# Patient Record
Sex: FEMALE | Race: BLACK OR AFRICAN AMERICAN | NOT HISPANIC OR LATINO | Employment: FULL TIME | URBAN - METROPOLITAN AREA
[De-identification: names, ages, dates, MRNs, and addresses within clinical notes are randomized per-mention and may not be internally consistent; named-entity substitution may affect disease eponyms.]

---

## 2023-04-17 ENCOUNTER — APPOINTMENT (OUTPATIENT)
Dept: PRIMARY CARE | Facility: CLINIC | Age: 63
End: 2023-04-17
Payer: COMMERCIAL

## 2023-07-31 ENCOUNTER — APPOINTMENT (OUTPATIENT)
Dept: PRIMARY CARE | Facility: CLINIC | Age: 63
End: 2023-07-31
Payer: COMMERCIAL

## 2023-09-08 PROBLEM — E55.9 VITAMIN D DEFICIENCY: Status: ACTIVE | Noted: 2023-09-08

## 2023-09-08 PROBLEM — K59.09 CHRONIC CONSTIPATION: Status: ACTIVE | Noted: 2023-09-08

## 2023-09-08 PROBLEM — D23.72 OTHER BENIGN NEOPLASM OF SKIN OF LEFT LOWER LIMB, INCLUDING HIP: Status: ACTIVE | Noted: 2023-05-15

## 2023-09-08 PROBLEM — M50.30 DEGENERATIVE DISC DISEASE, CERVICAL: Status: ACTIVE | Noted: 2023-09-08

## 2023-09-08 PROBLEM — D72.819 LEUKOPENIA: Status: ACTIVE | Noted: 2023-09-08

## 2023-09-08 PROBLEM — R00.1 BRADYCARDIA, DRUG INDUCED: Status: ACTIVE | Noted: 2023-09-08

## 2023-09-08 PROBLEM — I10 BENIGN ESSENTIAL HYPERTENSION: Status: ACTIVE | Noted: 2023-09-08

## 2023-09-08 PROBLEM — I95.1 ORTHOSTASIS: Status: ACTIVE | Noted: 2023-09-08

## 2023-09-08 PROBLEM — S29.012A STRAIN OF LATISSIMUS DORSI MUSCLE: Status: ACTIVE | Noted: 2023-09-08

## 2023-09-08 PROBLEM — H81.10 BENIGN PAROXYSMAL POSITIONAL VERTIGO: Status: ACTIVE | Noted: 2023-09-08

## 2023-09-08 PROBLEM — G57.01 PIRIFORMIS SYNDROME OF RIGHT SIDE: Status: ACTIVE | Noted: 2023-09-08

## 2023-09-08 PROBLEM — B35.1 MYCOTIC TOENAILS: Status: ACTIVE | Noted: 2023-09-08

## 2023-09-08 PROBLEM — R07.81 RIB PAIN ON RIGHT SIDE: Status: ACTIVE | Noted: 2023-09-08

## 2023-09-08 PROBLEM — R61 DIAPHORESIS: Status: ACTIVE | Noted: 2023-09-08

## 2023-09-08 PROBLEM — M54.50 LOW BACK PAIN: Status: ACTIVE | Noted: 2023-09-08

## 2023-09-08 PROBLEM — R25.2 MUSCLE CRAMPS: Status: ACTIVE | Noted: 2023-09-08

## 2023-09-08 PROBLEM — M79.676 TOE PAIN: Status: ACTIVE | Noted: 2023-09-08

## 2023-09-08 PROBLEM — N18.2 CHRONIC KIDNEY DISEASE, STAGE 2 (MILD): Status: ACTIVE | Noted: 2023-09-08

## 2023-09-08 PROBLEM — M21.42 FLAT FEET, BILATERAL: Status: ACTIVE | Noted: 2023-09-08

## 2023-09-08 PROBLEM — M21.40 PES PLANUS: Status: ACTIVE | Noted: 2023-09-08

## 2023-09-08 PROBLEM — R10.31 RIGHT GROIN PAIN: Status: ACTIVE | Noted: 2023-09-08

## 2023-09-08 PROBLEM — S80.02XA CONTUSION OF LEFT KNEE: Status: ACTIVE | Noted: 2023-09-08

## 2023-09-08 PROBLEM — M21.41 FLAT FEET, BILATERAL: Status: ACTIVE | Noted: 2023-09-08

## 2023-09-08 PROBLEM — R42 DIZZINESS: Status: ACTIVE | Noted: 2023-09-08

## 2023-09-08 PROBLEM — L85.3 XEROSIS CUTIS: Status: ACTIVE | Noted: 2023-09-08

## 2023-09-08 PROBLEM — M99.09 SEGMENTAL AND SOMATIC DYSFUNCTION: Status: ACTIVE | Noted: 2023-09-08

## 2023-09-08 PROBLEM — T50.905A BRADYCARDIA, DRUG INDUCED: Status: ACTIVE | Noted: 2023-09-08

## 2023-09-08 PROBLEM — M54.12 ACUTE CERVICAL RADICULOPATHY: Status: ACTIVE | Noted: 2023-09-08

## 2023-09-08 PROBLEM — M25.519 PAIN, JOINT, SHOULDER: Status: ACTIVE | Noted: 2023-09-08

## 2023-09-08 PROBLEM — R26.89 IMPAIRMENT OF BALANCE: Status: ACTIVE | Noted: 2023-09-08

## 2023-09-08 PROBLEM — J30.9 ALLERGIC RHINITIS: Status: ACTIVE | Noted: 2023-09-08

## 2023-09-08 PROBLEM — H61.23 IMPACTED CERUMEN OF BOTH EARS: Status: ACTIVE | Noted: 2023-09-08

## 2023-09-08 PROBLEM — M25.562 ACUTE PAIN OF LEFT KNEE: Status: ACTIVE | Noted: 2023-09-08

## 2023-09-08 PROBLEM — R26.0 ATAXIC GAIT: Status: ACTIVE | Noted: 2023-09-08

## 2023-09-08 PROBLEM — S46.812A STRAIN OF LEFT TRAPEZIUS MUSCLE: Status: ACTIVE | Noted: 2023-09-08

## 2023-09-08 PROBLEM — M19.90 ARTHRITIS: Status: ACTIVE | Noted: 2023-09-08

## 2023-09-08 PROBLEM — M54.2 NECK PAIN: Status: ACTIVE | Noted: 2023-09-08

## 2023-09-08 PROBLEM — K64.9 HEMORRHOIDS: Status: ACTIVE | Noted: 2023-09-08

## 2023-09-08 PROBLEM — E78.00 HYPERCHOLESTEROLEMIA: Status: ACTIVE | Noted: 2023-09-08

## 2023-09-11 ENCOUNTER — OFFICE VISIT (OUTPATIENT)
Dept: PRIMARY CARE | Facility: CLINIC | Age: 63
End: 2023-09-11
Payer: COMMERCIAL

## 2023-09-11 ENCOUNTER — LAB (OUTPATIENT)
Dept: LAB | Facility: LAB | Age: 63
End: 2023-09-11
Payer: COMMERCIAL

## 2023-09-11 VITALS
OXYGEN SATURATION: 99 % | BODY MASS INDEX: 22.96 KG/M2 | WEIGHT: 129.6 LBS | SYSTOLIC BLOOD PRESSURE: 117 MMHG | HEIGHT: 63 IN | DIASTOLIC BLOOD PRESSURE: 77 MMHG | HEART RATE: 64 BPM | RESPIRATION RATE: 16 BRPM

## 2023-09-11 DIAGNOSIS — E78.00 HYPERCHOLESTEROLEMIA: ICD-10-CM

## 2023-09-11 DIAGNOSIS — I10 BENIGN ESSENTIAL HYPERTENSION: ICD-10-CM

## 2023-09-11 DIAGNOSIS — G89.29 CHRONIC PAIN OF BOTH KNEES: ICD-10-CM

## 2023-09-11 DIAGNOSIS — M25.562 CHRONIC PAIN OF BOTH KNEES: ICD-10-CM

## 2023-09-11 DIAGNOSIS — N18.2 CHRONIC KIDNEY DISEASE, STAGE 2 (MILD): ICD-10-CM

## 2023-09-11 DIAGNOSIS — M25.561 CHRONIC PAIN OF BOTH KNEES: ICD-10-CM

## 2023-09-11 DIAGNOSIS — M48.02 STENOSIS OF CERVICAL SPINE: ICD-10-CM

## 2023-09-11 DIAGNOSIS — Z12.31 ENCOUNTER FOR SCREENING MAMMOGRAM FOR MALIGNANT NEOPLASM OF BREAST: ICD-10-CM

## 2023-09-11 DIAGNOSIS — I10 BENIGN ESSENTIAL HYPERTENSION: Primary | ICD-10-CM

## 2023-09-11 LAB
ALANINE AMINOTRANSFERASE (SGPT) (U/L) IN SER/PLAS: 21 U/L (ref 7–45)
ALBUMIN (G/DL) IN SER/PLAS: 4.7 G/DL (ref 3.4–5)
ALKALINE PHOSPHATASE (U/L) IN SER/PLAS: 94 U/L (ref 33–136)
ANION GAP IN SER/PLAS: 12 MMOL/L (ref 10–20)
ASPARTATE AMINOTRANSFERASE (SGOT) (U/L) IN SER/PLAS: 21 U/L (ref 9–39)
BASOPHILS (10*3/UL) IN BLOOD BY AUTOMATED COUNT: 0.03 X10E9/L (ref 0–0.1)
BASOPHILS/100 LEUKOCYTES IN BLOOD BY AUTOMATED COUNT: 1 % (ref 0–2)
BILIRUBIN TOTAL (MG/DL) IN SER/PLAS: 0.7 MG/DL (ref 0–1.2)
CALCIUM (MG/DL) IN SER/PLAS: 10 MG/DL (ref 8.6–10.6)
CARBON DIOXIDE, TOTAL (MMOL/L) IN SER/PLAS: 30 MMOL/L (ref 21–32)
CHLORIDE (MMOL/L) IN SER/PLAS: 107 MMOL/L (ref 98–107)
CHOLESTEROL (MG/DL) IN SER/PLAS: 213 MG/DL (ref 0–199)
CHOLESTEROL IN HDL (MG/DL) IN SER/PLAS: 84.2 MG/DL
CHOLESTEROL/HDL RATIO: 2.5
CREATININE (MG/DL) IN SER/PLAS: 0.86 MG/DL (ref 0.5–1.05)
EOSINOPHILS (10*3/UL) IN BLOOD BY AUTOMATED COUNT: 0.07 X10E9/L (ref 0–0.7)
EOSINOPHILS/100 LEUKOCYTES IN BLOOD BY AUTOMATED COUNT: 2.4 % (ref 0–6)
ERYTHROCYTE DISTRIBUTION WIDTH (RATIO) BY AUTOMATED COUNT: 12.1 % (ref 11.5–14.5)
ERYTHROCYTE MEAN CORPUSCULAR HEMOGLOBIN CONCENTRATION (G/DL) BY AUTOMATED: 31.4 G/DL (ref 32–36)
ERYTHROCYTE MEAN CORPUSCULAR VOLUME (FL) BY AUTOMATED COUNT: 96 FL (ref 80–100)
ERYTHROCYTES (10*6/UL) IN BLOOD BY AUTOMATED COUNT: 4.66 X10E12/L (ref 4–5.2)
ESTIMATED AVERAGE GLUCOSE FOR HBA1C: 105 MG/DL
GFR FEMALE: 75 ML/MIN/1.73M2
GLUCOSE (MG/DL) IN SER/PLAS: 82 MG/DL (ref 74–99)
HEMATOCRIT (%) IN BLOOD BY AUTOMATED COUNT: 44.6 % (ref 36–46)
HEMOGLOBIN (G/DL) IN BLOOD: 14 G/DL (ref 12–16)
HEMOGLOBIN A1C/HEMOGLOBIN TOTAL IN BLOOD: 5.3 %
IMMATURE GRANULOCYTES/100 LEUKOCYTES IN BLOOD BY AUTOMATED COUNT: 0 % (ref 0–0.9)
LDL: 119 MG/DL (ref 0–99)
LEUKOCYTES (10*3/UL) IN BLOOD BY AUTOMATED COUNT: 2.9 X10E9/L (ref 4.4–11.3)
LYMPHOCYTES (10*3/UL) IN BLOOD BY AUTOMATED COUNT: 1.2 X10E9/L (ref 1.2–4.8)
LYMPHOCYTES/100 LEUKOCYTES IN BLOOD BY AUTOMATED COUNT: 41 % (ref 13–44)
MONOCYTES (10*3/UL) IN BLOOD BY AUTOMATED COUNT: 0.27 X10E9/L (ref 0.1–1)
MONOCYTES/100 LEUKOCYTES IN BLOOD BY AUTOMATED COUNT: 9.2 % (ref 2–10)
NEUTROPHILS (10*3/UL) IN BLOOD BY AUTOMATED COUNT: 1.36 X10E9/L (ref 1.2–7.7)
NEUTROPHILS/100 LEUKOCYTES IN BLOOD BY AUTOMATED COUNT: 46.4 % (ref 40–80)
NRBC (PER 100 WBCS) BY AUTOMATED COUNT: 0 /100 WBC (ref 0–0)
PLATELETS (10*3/UL) IN BLOOD AUTOMATED COUNT: 230 X10E9/L (ref 150–450)
POTASSIUM (MMOL/L) IN SER/PLAS: 4.2 MMOL/L (ref 3.5–5.3)
PROTEIN TOTAL: 7.4 G/DL (ref 6.4–8.2)
SODIUM (MMOL/L) IN SER/PLAS: 145 MMOL/L (ref 136–145)
THYROTROPIN (MIU/L) IN SER/PLAS BY DETECTION LIMIT <= 0.05 MIU/L: 2.08 MIU/L (ref 0.44–3.98)
TRIGLYCERIDE (MG/DL) IN SER/PLAS: 49 MG/DL (ref 0–149)
URATE (MG/DL) IN SER/PLAS: 4.3 MG/DL (ref 2.3–6.7)
UREA NITROGEN (MG/DL) IN SER/PLAS: 17 MG/DL (ref 6–23)
VLDL: 10 MG/DL (ref 0–40)

## 2023-09-11 PROCEDURE — 36415 COLL VENOUS BLD VENIPUNCTURE: CPT

## 2023-09-11 PROCEDURE — 80053 COMPREHEN METABOLIC PANEL: CPT

## 2023-09-11 PROCEDURE — 85025 COMPLETE CBC W/AUTO DIFF WBC: CPT

## 2023-09-11 PROCEDURE — 90471 IMMUNIZATION ADMIN: CPT | Performed by: INTERNAL MEDICINE

## 2023-09-11 PROCEDURE — 1036F TOBACCO NON-USER: CPT | Performed by: INTERNAL MEDICINE

## 2023-09-11 PROCEDURE — 80061 LIPID PANEL: CPT

## 2023-09-11 PROCEDURE — 99214 OFFICE O/P EST MOD 30 MIN: CPT | Performed by: INTERNAL MEDICINE

## 2023-09-11 PROCEDURE — 84443 ASSAY THYROID STIM HORMONE: CPT

## 2023-09-11 PROCEDURE — 83036 HEMOGLOBIN GLYCOSYLATED A1C: CPT

## 2023-09-11 PROCEDURE — 3074F SYST BP LT 130 MM HG: CPT | Performed by: INTERNAL MEDICINE

## 2023-09-11 PROCEDURE — 90686 IIV4 VACC NO PRSV 0.5 ML IM: CPT | Performed by: INTERNAL MEDICINE

## 2023-09-11 PROCEDURE — 84550 ASSAY OF BLOOD/URIC ACID: CPT

## 2023-09-11 PROCEDURE — 3078F DIAST BP <80 MM HG: CPT | Performed by: INTERNAL MEDICINE

## 2023-09-11 RX ORDER — PSYLLIUM HUSK 3.4 G/5.8G
POWDER ORAL DAILY
COMMUNITY

## 2023-09-11 RX ORDER — AMLODIPINE BESYLATE 10 MG/1
10 TABLET ORAL DAILY
Qty: 90 TABLET | Refills: 1 | Status: SHIPPED | OUTPATIENT
Start: 2023-09-11 | End: 2024-03-11

## 2023-09-11 RX ORDER — AMLODIPINE BESYLATE 10 MG/1
10 TABLET ORAL DAILY
COMMUNITY
Start: 2021-11-18 | End: 2023-09-11 | Stop reason: SDUPTHER

## 2023-09-11 RX ORDER — LYSINE HCL 500 MG
1 TABLET ORAL DAILY
COMMUNITY
Start: 2018-08-13

## 2023-09-11 ASSESSMENT — ENCOUNTER SYMPTOMS
DEPRESSION: 0
FEVER: 0
NECK STIFFNESS: 0
DIZZINESS: 0
CHILLS: 0
COUGH: 0
FREQUENCY: 0
MYALGIAS: 0
WEAKNESS: 0
DYSURIA: 0
APPETITE CHANGE: 0
NUMBNESS: 0
ARTHRALGIAS: 0
JOINT SWELLING: 0
NECK PAIN: 0
FATIGUE: 0
OCCASIONAL FEELINGS OF UNSTEADINESS: 0
HEADACHES: 0
LIGHT-HEADEDNESS: 0
BACK PAIN: 0
ABDOMINAL DISTENTION: 0
DIAPHORESIS: 0
PALPITATIONS: 0
RHINORRHEA: 0
SHORTNESS OF BREATH: 0
BLOOD IN STOOL: 0
VOMITING: 0
HEMATURIA: 0
WHEEZING: 0
CONSTIPATION: 0
SINUS PRESSURE: 0
ABDOMINAL PAIN: 0
LOSS OF SENSATION IN FEET: 0
DIARRHEA: 0
SORE THROAT: 0
DIFFICULTY URINATING: 0
NAUSEA: 0

## 2023-09-11 ASSESSMENT — PATIENT HEALTH QUESTIONNAIRE - PHQ9
SUM OF ALL RESPONSES TO PHQ9 QUESTIONS 1 AND 2: 0
SUM OF ALL RESPONSES TO PHQ9 QUESTIONS 1 AND 2: 0
2. FEELING DOWN, DEPRESSED OR HOPELESS: NOT AT ALL
2. FEELING DOWN, DEPRESSED OR HOPELESS: NOT AT ALL
1. LITTLE INTEREST OR PLEASURE IN DOING THINGS: NOT AT ALL
1. LITTLE INTEREST OR PLEASURE IN DOING THINGS: NOT AT ALL

## 2023-09-11 NOTE — PROGRESS NOTES
"Subjective   Patient ID: Maddie Flynn is a 63 y.o. female who presents for Establish Care.    HPI   She complains of neck pain radiating into the right shoulder. As well as bilateral knee pain.     Review of Systems   Constitutional:  Negative for appetite change, chills, diaphoresis, fatigue and fever.   HENT:  Negative for congestion, ear discharge, ear pain, hearing loss, postnasal drip, rhinorrhea, sinus pressure, sore throat and tinnitus.    Eyes:  Negative for visual disturbance.   Respiratory:  Negative for cough, shortness of breath and wheezing.    Cardiovascular:  Negative for chest pain, palpitations and leg swelling.   Gastrointestinal:  Negative for abdominal distention, abdominal pain, blood in stool, constipation, diarrhea, nausea and vomiting.   Genitourinary:  Negative for decreased urine volume, difficulty urinating, dysuria, frequency, hematuria and urgency.   Musculoskeletal:  Negative for arthralgias, back pain, gait problem, joint swelling, myalgias, neck pain and neck stiffness.   Skin:  Negative for rash.   Neurological:  Negative for dizziness, weakness, light-headedness, numbness and headaches.       Objective   /77 (BP Location: Left arm, Patient Position: Sitting, BP Cuff Size: Adult)   Pulse 64   Resp 16   Ht 1.6 m (5' 3\")   Wt 58.8 kg (129 lb 9.6 oz)   SpO2 99%   BMI 22.96 kg/m²     Physical Exam  Vitals reviewed.   Constitutional:       Appearance: Normal appearance. She is normal weight.   HENT:      Right Ear: Tympanic membrane and external ear normal.      Left Ear: Tympanic membrane and external ear normal.   Eyes:      Extraocular Movements: Extraocular movements intact.      Conjunctiva/sclera: Conjunctivae normal.      Pupils: Pupils are equal, round, and reactive to light.   Cardiovascular:      Rate and Rhythm: Normal rate and regular rhythm.      Pulses: Normal pulses.   Pulmonary:      Effort: Pulmonary effort is normal.      Breath sounds: Normal breath " sounds.   Abdominal:      General: Bowel sounds are normal.      Palpations: Abdomen is soft.   Musculoskeletal:         General: Normal range of motion.      Cervical back: Normal range of motion.   Skin:     General: Skin is warm and dry.   Neurological:      General: No focal deficit present.      Mental Status: She is oriented to person, place, and time.   Psychiatric:         Mood and Affect: Mood normal.         Behavior: Behavior normal.         Assessment/Plan   Problem List Items Addressed This Visit       Benign essential hypertension - Primary     -BP below target goal 130/80  -CMP, TSH ordered  -Continue amlodipine 10 mg daily  -Educated about adverse effects of uncontrolled blood pressure, importance of self blood pressure monitoring and bring to next visit.  -Recommend low sodium diet and regular exercise            Relevant Medications    amLODIPine (Norvasc) 10 mg tablet    Other Relevant Orders    CBC and Auto Differential    Comprehensive Metabolic Panel    Hemoglobin A1C    TSH with reflex to Free T4 if abnormal    Chronic kidney disease, stage 2 (mild)     Monitor          Relevant Orders    CBC and Auto Differential    Comprehensive Metabolic Panel    Hypercholesterolemia    Relevant Orders    Hemoglobin A1C    Lipid Panel    Stenosis of cervical spine     She has done PT and continues to do her exercises at home. At the point she feels these are no longer providing adequate relief.  She is interested in IA injections, referred to orthopedics for this.          Relevant Orders    Referral to Orthopaedic Surgery    Encounter for screening mammogram for malignant neoplasm of breast    Relevant Orders    BI mammo bilateral screening tomosynthesis    Chronic pain of both knees     She walks a lot, pain is worse after walking a prolonged distance.   She does warm water soaks at home and this seems to relieve the pain  She is deferring joint imaging and PT for now. Will do such if pain worsens.  Uric  acid ordered.          Relevant Orders    Uric acid     RTC in 6 mo

## 2023-09-11 NOTE — ASSESSMENT & PLAN NOTE
-BP below target goal 130/80  -CMP, TSH ordered  -Continue amlodipine 10 mg daily  -Educated about adverse effects of uncontrolled blood pressure, importance of self blood pressure monitoring and bring to next visit.  -Recommend low sodium diet and regular exercise

## 2023-09-11 NOTE — ASSESSMENT & PLAN NOTE
She has done PT and continues to do her exercises at home. At the point she feels these are no longer providing adequate relief.  She is interested in IA injections, referred to orthopedics for this.

## 2023-09-11 NOTE — ASSESSMENT & PLAN NOTE
She walks a lot, pain is worse after walking a prolonged distance.   She does warm water soaks at home and this seems to relieve the pain  She is deferring joint imaging and PT for now. Will do such if pain worsens.  Uric acid ordered.

## 2023-09-14 NOTE — RESULT ENCOUNTER NOTE
Leukopenia with normal absolute cell count.  Will monitor.    Cholesterol levels elevated.  I recommend a statin to decrease cholesterol levels.    Uric acid levels normal.    Labs otherwise unremarkable.

## 2023-10-09 ENCOUNTER — ANCILLARY PROCEDURE (OUTPATIENT)
Dept: RADIOLOGY | Facility: CLINIC | Age: 63
End: 2023-10-09
Payer: COMMERCIAL

## 2023-10-09 VITALS — WEIGHT: 129.63 LBS | HEIGHT: 63 IN | BODY MASS INDEX: 22.97 KG/M2

## 2023-10-09 DIAGNOSIS — Z12.31 ENCOUNTER FOR SCREENING MAMMOGRAM FOR MALIGNANT NEOPLASM OF BREAST: ICD-10-CM

## 2023-10-09 PROCEDURE — 77063 BREAST TOMOSYNTHESIS BI: CPT | Mod: BILATERAL PROCEDURE | Performed by: RADIOLOGY

## 2023-10-09 PROCEDURE — 77063 BREAST TOMOSYNTHESIS BI: CPT | Mod: 50

## 2023-10-09 PROCEDURE — 77067 SCR MAMMO BI INCL CAD: CPT | Mod: BILATERAL PROCEDURE | Performed by: RADIOLOGY

## 2023-10-11 PROBLEM — M43.10 ISTHMIC SPONDYLOLISTHESIS: Status: ACTIVE | Noted: 2023-10-11

## 2023-10-11 RX ORDER — ATORVASTATIN CALCIUM 20 MG/1
20 TABLET, FILM COATED ORAL DAILY
COMMUNITY

## 2023-10-11 RX ORDER — LORATADINE 10 MG/1
10 TABLET ORAL DAILY
COMMUNITY

## 2023-10-12 ENCOUNTER — OFFICE VISIT (OUTPATIENT)
Dept: ORTHOPEDIC SURGERY | Facility: CLINIC | Age: 63
End: 2023-10-12
Payer: COMMERCIAL

## 2023-10-12 ENCOUNTER — ANCILLARY PROCEDURE (OUTPATIENT)
Dept: RADIOLOGY | Facility: CLINIC | Age: 63
End: 2023-10-12
Payer: COMMERCIAL

## 2023-10-12 VITALS — BODY MASS INDEX: 21.97 KG/M2 | WEIGHT: 124 LBS | HEIGHT: 63 IN

## 2023-10-12 DIAGNOSIS — M54.2 NECK PAIN: ICD-10-CM

## 2023-10-12 DIAGNOSIS — M54.2 NECK PAIN: Primary | ICD-10-CM

## 2023-10-12 DIAGNOSIS — M54.12 CERVICAL RADICULOPATHY: ICD-10-CM

## 2023-10-12 PROCEDURE — 1036F TOBACCO NON-USER: CPT | Performed by: PHYSICIAN ASSISTANT

## 2023-10-12 PROCEDURE — 72040 X-RAY EXAM NECK SPINE 2-3 VW: CPT | Mod: FY

## 2023-10-12 PROCEDURE — 99213 OFFICE O/P EST LOW 20 MIN: CPT | Performed by: PHYSICIAN ASSISTANT

## 2023-10-12 PROCEDURE — 72040 X-RAY EXAM NECK SPINE 2-3 VW: CPT | Performed by: RADIOLOGY

## 2023-10-12 NOTE — PROGRESS NOTES
Maddie is a 63-year-old female who presents today to be evaluated for chronic and ongoing axial neck pain right-sided with right shoulder discomfort.  Patient states the symptoms have been going on since 2009 but have progressively become more bothersome over the last year or so.    Patient states that her symptoms are all localized to the right axial spine with right trapezial and shoulder pain.  She also describes it as more of a burning sensation in the trapezial region  Thankfully no extremity radiculopathy or myelopathy bilaterally.  She has full range of motion of her arms but does describe some stiffness in the right side of her neck.  No change in her gait or balance.    From a treatment standpoint she takes turmeric, she does not like to take medication.  She has not done any formal physical therapy or pain management recently.    Family, social, and medical histories are obtained and reviewed.    I reviewed the complete 30-point review of systems that was documented on the scanned patient intake form.  All other systems are non-contributory except as defined in history of present illness.    Const: Well-appearing, well-nourished female in no distress.  Eyes: Normal appearing sclera and conjunctiva, no jaundice, pupils normal in appearance.  Neck: No masses or lymphadenopathy appreciated.  Resp: breathing comfortably, normal respiratory rate rate.  CV: No upper or lower extremity edema.  Musculoskeletal: [Normal gait]. [Able to heel/toe walk without difficulty.]  Cervical ROM is [supple].  Strength exam of the upper extremities reveals 5/5 strength in all major muscle groups the patient has normal strength in her upper extremities but it was noted that she has severe and significant tightness of her right trapezial muscle..  [No intrinsic wasting.] [Negative] Spurling sign.    Neuro: Sensation is [intact and equal bilaterally]. Deep tendon reflexes are [normal and symmetric].  [No clonus], [negative]  Camara sign, [negative] Lhermitte's sign  Skin: Intact without any lesions, normal turgor.  Psych: Alert and oriented x3, normal mood and affect.    Moving forward, we will order x-rays of her cervical spine.  We will treat this very conservatively, she was given a referral for physical therapy and she will follow-up with me in 6 weeks for clinical recheck.  If it does not improve we discussed potentially ordering an MRI of her cervical spine or even starting a work-up on her right shoulder.  Patient was in agreement.    This note was dictated using speech recognition software and was not corrected for spelling or grammatical errors

## 2023-12-07 ENCOUNTER — APPOINTMENT (OUTPATIENT)
Dept: ORTHOPEDIC SURGERY | Facility: CLINIC | Age: 63
End: 2023-12-07
Payer: COMMERCIAL

## 2024-03-06 DIAGNOSIS — I10 BENIGN ESSENTIAL HYPERTENSION: ICD-10-CM

## 2024-03-11 RX ORDER — AMLODIPINE BESYLATE 10 MG/1
10 TABLET ORAL DAILY
Qty: 30 TABLET | Refills: 0 | Status: SHIPPED | OUTPATIENT
Start: 2024-03-11 | End: 2024-03-18 | Stop reason: SDUPTHER

## 2024-03-12 ENCOUNTER — APPOINTMENT (OUTPATIENT)
Dept: PRIMARY CARE | Facility: CLINIC | Age: 64
End: 2024-03-12
Payer: COMMERCIAL

## 2024-03-18 ENCOUNTER — LAB (OUTPATIENT)
Dept: LAB | Facility: LAB | Age: 64
End: 2024-03-18
Payer: COMMERCIAL

## 2024-03-18 ENCOUNTER — OFFICE VISIT (OUTPATIENT)
Dept: PRIMARY CARE | Facility: CLINIC | Age: 64
End: 2024-03-18
Payer: COMMERCIAL

## 2024-03-18 VITALS
BODY MASS INDEX: 22.15 KG/M2 | HEIGHT: 63 IN | OXYGEN SATURATION: 98 % | DIASTOLIC BLOOD PRESSURE: 70 MMHG | RESPIRATION RATE: 16 BRPM | HEART RATE: 65 BPM | SYSTOLIC BLOOD PRESSURE: 108 MMHG | WEIGHT: 125 LBS

## 2024-03-18 DIAGNOSIS — E78.00 HYPERCHOLESTEROLEMIA: Primary | ICD-10-CM

## 2024-03-18 DIAGNOSIS — Z00.00 HEALTHCARE MAINTENANCE: ICD-10-CM

## 2024-03-18 DIAGNOSIS — E78.00 HYPERCHOLESTEROLEMIA: ICD-10-CM

## 2024-03-18 DIAGNOSIS — I10 BENIGN ESSENTIAL HYPERTENSION: ICD-10-CM

## 2024-03-18 LAB
ALBUMIN SERPL BCP-MCNC: 4.8 G/DL (ref 3.4–5)
ALP SERPL-CCNC: 103 U/L (ref 33–136)
ALT SERPL W P-5'-P-CCNC: 16 U/L (ref 7–45)
ANION GAP SERPL CALC-SCNC: 11 MMOL/L (ref 10–20)
AST SERPL W P-5'-P-CCNC: 20 U/L (ref 9–39)
BASOPHILS # BLD AUTO: 0.06 X10*3/UL (ref 0–0.1)
BASOPHILS NFR BLD AUTO: 1.6 %
BILIRUB SERPL-MCNC: 0.6 MG/DL (ref 0–1.2)
BUN SERPL-MCNC: 15 MG/DL (ref 6–23)
CALCIUM SERPL-MCNC: 10.2 MG/DL (ref 8.6–10.6)
CHLORIDE SERPL-SCNC: 104 MMOL/L (ref 98–107)
CHOLEST SERPL-MCNC: 234 MG/DL (ref 0–199)
CHOLESTEROL/HDL RATIO: 2.6
CO2 SERPL-SCNC: 33 MMOL/L (ref 21–32)
CREAT SERPL-MCNC: 0.85 MG/DL (ref 0.5–1.05)
EGFRCR SERPLBLD CKD-EPI 2021: 77 ML/MIN/1.73M*2
EOSINOPHIL # BLD AUTO: 0.07 X10*3/UL (ref 0–0.7)
EOSINOPHIL NFR BLD AUTO: 1.9 %
ERYTHROCYTE [DISTWIDTH] IN BLOOD BY AUTOMATED COUNT: 12.1 % (ref 11.5–14.5)
EST. AVERAGE GLUCOSE BLD GHB EST-MCNC: 103 MG/DL
GLUCOSE SERPL-MCNC: 64 MG/DL (ref 74–99)
HBA1C MFR BLD: 5.2 %
HCT VFR BLD AUTO: 46.1 % (ref 36–46)
HDLC SERPL-MCNC: 89.2 MG/DL
HGB BLD-MCNC: 14.4 G/DL (ref 12–16)
IMM GRANULOCYTES # BLD AUTO: 0.01 X10*3/UL (ref 0–0.7)
IMM GRANULOCYTES NFR BLD AUTO: 0.3 % (ref 0–0.9)
LDLC SERPL DIRECT ASSAY-MCNC: 124 MG/DL (ref 0–129)
LYMPHOCYTES # BLD AUTO: 1.3 X10*3/UL (ref 1.2–4.8)
LYMPHOCYTES NFR BLD AUTO: 35.7 %
MCH RBC QN AUTO: 29.2 PG (ref 26–34)
MCHC RBC AUTO-ENTMCNC: 31.2 G/DL (ref 32–36)
MCV RBC AUTO: 94 FL (ref 80–100)
MONOCYTES # BLD AUTO: 0.36 X10*3/UL (ref 0.1–1)
MONOCYTES NFR BLD AUTO: 9.9 %
NEUTROPHILS # BLD AUTO: 1.84 X10*3/UL (ref 1.2–7.7)
NEUTROPHILS NFR BLD AUTO: 50.6 %
NON-HDL CHOLESTEROL: 145 MG/DL (ref 0–149)
NRBC BLD-RTO: 0 /100 WBCS (ref 0–0)
PLATELET # BLD AUTO: 219 X10*3/UL (ref 150–450)
POTASSIUM SERPL-SCNC: 4 MMOL/L (ref 3.5–5.3)
PROT SERPL-MCNC: 7.5 G/DL (ref 6.4–8.2)
RBC # BLD AUTO: 4.93 X10*6/UL (ref 4–5.2)
SODIUM SERPL-SCNC: 144 MMOL/L (ref 136–145)
TSH SERPL-ACNC: 2.96 MIU/L (ref 0.44–3.98)
WBC # BLD AUTO: 3.6 X10*3/UL (ref 4.4–11.3)

## 2024-03-18 PROCEDURE — 99396 PREV VISIT EST AGE 40-64: CPT | Performed by: INTERNAL MEDICINE

## 2024-03-18 PROCEDURE — 85025 COMPLETE CBC W/AUTO DIFF WBC: CPT

## 2024-03-18 PROCEDURE — 90750 HZV VACC RECOMBINANT IM: CPT | Performed by: INTERNAL MEDICINE

## 2024-03-18 PROCEDURE — 3078F DIAST BP <80 MM HG: CPT | Performed by: INTERNAL MEDICINE

## 2024-03-18 PROCEDURE — 1036F TOBACCO NON-USER: CPT | Performed by: INTERNAL MEDICINE

## 2024-03-18 PROCEDURE — 82465 ASSAY BLD/SERUM CHOLESTEROL: CPT

## 2024-03-18 PROCEDURE — 84443 ASSAY THYROID STIM HORMONE: CPT

## 2024-03-18 PROCEDURE — 83036 HEMOGLOBIN GLYCOSYLATED A1C: CPT

## 2024-03-18 PROCEDURE — 83718 ASSAY OF LIPOPROTEIN: CPT

## 2024-03-18 PROCEDURE — 36415 COLL VENOUS BLD VENIPUNCTURE: CPT

## 2024-03-18 PROCEDURE — 3074F SYST BP LT 130 MM HG: CPT | Performed by: INTERNAL MEDICINE

## 2024-03-18 PROCEDURE — 83721 ASSAY OF BLOOD LIPOPROTEIN: CPT

## 2024-03-18 PROCEDURE — 90471 IMMUNIZATION ADMIN: CPT | Performed by: INTERNAL MEDICINE

## 2024-03-18 PROCEDURE — 80053 COMPREHEN METABOLIC PANEL: CPT

## 2024-03-18 RX ORDER — AMLODIPINE BESYLATE 10 MG/1
10 TABLET ORAL DAILY
Qty: 90 TABLET | Refills: 0 | Status: SHIPPED | OUTPATIENT
Start: 2024-03-18

## 2024-03-18 ASSESSMENT — ENCOUNTER SYMPTOMS
CONSTIPATION: 0
DIFFICULTY URINATING: 0
VOMITING: 0
NECK STIFFNESS: 0
DEPRESSION: 0
NUMBNESS: 0
SHORTNESS OF BREATH: 0
DIZZINESS: 0
DIARRHEA: 0
LIGHT-HEADEDNESS: 0
DIAPHORESIS: 0
HEMATURIA: 0
SORE THROAT: 0
BACK PAIN: 0
WHEEZING: 0
MYALGIAS: 0
ABDOMINAL DISTENTION: 0
PALPITATIONS: 0
COUGH: 0
JOINT SWELLING: 0
FEVER: 0
OCCASIONAL FEELINGS OF UNSTEADINESS: 0
FREQUENCY: 0
LOSS OF SENSATION IN FEET: 0
SINUS PRESSURE: 0
APPETITE CHANGE: 0
BLOOD IN STOOL: 0
NAUSEA: 0
FATIGUE: 0
ARTHRALGIAS: 0
WEAKNESS: 0
DYSURIA: 0
NECK PAIN: 0
CHILLS: 0
ABDOMINAL PAIN: 0
HEADACHES: 0
RHINORRHEA: 0

## 2024-03-18 ASSESSMENT — PATIENT HEALTH QUESTIONNAIRE - PHQ9
SUM OF ALL RESPONSES TO PHQ9 QUESTIONS 1 AND 2: 0
1. LITTLE INTEREST OR PLEASURE IN DOING THINGS: NOT AT ALL
2. FEELING DOWN, DEPRESSED OR HOPELESS: NOT AT ALL

## 2024-03-18 NOTE — PROGRESS NOTES
"Subjective   Patient ID: Maddie Flynn is a 63 y.o. female who presents for Annual Exam.    HPI   She present for follow-up. She is doing well generally.     Review of Systems   Constitutional:  Negative for appetite change, chills, diaphoresis, fatigue and fever.   HENT:  Negative for congestion, ear discharge, ear pain, hearing loss, postnasal drip, rhinorrhea, sinus pressure, sore throat and tinnitus.    Eyes:  Negative for visual disturbance.   Respiratory:  Negative for cough, shortness of breath and wheezing.    Cardiovascular:  Negative for chest pain, palpitations and leg swelling.   Gastrointestinal:  Negative for abdominal distention, abdominal pain, blood in stool, constipation, diarrhea, nausea and vomiting.   Genitourinary:  Negative for decreased urine volume, difficulty urinating, dysuria, frequency, hematuria and urgency.   Musculoskeletal:  Negative for arthralgias, back pain, gait problem, joint swelling, myalgias, neck pain and neck stiffness.   Skin:  Negative for rash.   Neurological:  Negative for dizziness, weakness, light-headedness, numbness and headaches.         Objective   /70   Pulse 65   Resp 16   Ht 1.6 m (5' 3\")   Wt 56.7 kg (125 lb)   SpO2 98%   BMI 22.14 kg/m²     Physical Exam  Vitals reviewed.   Constitutional:       Appearance: Normal appearance. She is normal weight.   HENT:      Right Ear: Tympanic membrane and external ear normal.      Left Ear: Tympanic membrane and external ear normal.   Eyes:      Extraocular Movements: Extraocular movements intact.      Conjunctiva/sclera: Conjunctivae normal.      Pupils: Pupils are equal, round, and reactive to light.   Cardiovascular:      Rate and Rhythm: Normal rate and regular rhythm.      Pulses: Normal pulses.   Pulmonary:      Effort: Pulmonary effort is normal.      Breath sounds: Normal breath sounds.   Abdominal:      General: Bowel sounds are normal.      Palpations: Abdomen is soft.   Musculoskeletal:         " General: Normal range of motion.      Cervical back: Normal range of motion.   Skin:     General: Skin is warm and dry.   Neurological:      General: No focal deficit present.      Mental Status: She is oriented to person, place, and time.   Psychiatric:         Mood and Affect: Mood normal.         Behavior: Behavior normal.           Assessment/Plan   Problem List Items Addressed This Visit             ICD-10-CM    Benign essential hypertension I10     -BP below target goal 130/80  -CMP, TSH ordered  -Continue amlodipine 10 mg daily  -Recommend low sodium diet and regular exercise            Relevant Medications    amLODIPine (Norvasc) 10 mg tablet    Other Relevant Orders    CBC and Auto Differential    Comprehensive Metabolic Panel    TSH with reflex to Free T4 if abnormal    Hypercholesterolemia - Primary E78.00     Report memory issues with statin hence discontinue   Consider adding ezetimibe              Relevant Orders    Cholesterol, LDL Direct    Lipid Panel Non-Fasting    Healthcare maintenance Z00.00     Mammogram UTD  Colonoscopy UTD   Shingrx first does given in office today          Relevant Orders    CBC and Auto Differential    Cholesterol, LDL Direct    Comprehensive Metabolic Panel    Hemoglobin A1C    Lipid Panel Non-Fasting    TSH with reflex to Free T4 if abnormal   RTC in 6 mo

## 2024-03-18 NOTE — ASSESSMENT & PLAN NOTE
-BP below target goal 130/80  -CMP, TSH ordered  -Continue amlodipine 10 mg daily  -Recommend low sodium diet and regular exercise

## 2024-03-19 NOTE — RESULT ENCOUNTER NOTE
Your Cholesterol levels are elevated.  Patient unable to tolerate statin.  I recommend eating a healthy diet, avoiding foods high in saturated fats and processed sugars, as well as exercising regularly.     Blood test otherwise normal.

## 2024-05-13 ENCOUNTER — CLINICAL SUPPORT (OUTPATIENT)
Dept: PRIMARY CARE | Facility: CLINIC | Age: 64
End: 2024-05-13
Payer: MEDICARE

## 2024-05-13 DIAGNOSIS — Z23 ENCOUNTER FOR ADMINISTRATION OF VACCINE: Primary | ICD-10-CM

## 2024-05-13 PROCEDURE — 90750 HZV VACC RECOMBINANT IM: CPT | Performed by: INTERNAL MEDICINE

## 2024-05-13 PROCEDURE — 90471 IMMUNIZATION ADMIN: CPT | Performed by: INTERNAL MEDICINE

## 2024-09-05 DIAGNOSIS — I10 BENIGN ESSENTIAL HYPERTENSION: ICD-10-CM

## 2024-09-06 RX ORDER — AMLODIPINE BESYLATE 10 MG/1
10 TABLET ORAL DAILY
Qty: 90 TABLET | Refills: 0 | Status: SHIPPED | OUTPATIENT
Start: 2024-09-06

## 2024-09-23 ENCOUNTER — APPOINTMENT (OUTPATIENT)
Dept: PRIMARY CARE | Facility: CLINIC | Age: 64
End: 2024-09-23
Payer: COMMERCIAL

## 2024-12-09 ENCOUNTER — APPOINTMENT (OUTPATIENT)
Dept: PRIMARY CARE | Facility: CLINIC | Age: 64
End: 2024-12-09
Payer: MEDICARE

## 2024-12-12 ENCOUNTER — OFFICE VISIT (OUTPATIENT)
Dept: PRIMARY CARE | Facility: CLINIC | Age: 64
End: 2024-12-12
Payer: MEDICARE

## 2024-12-12 VITALS
DIASTOLIC BLOOD PRESSURE: 76 MMHG | RESPIRATION RATE: 18 BRPM | BODY MASS INDEX: 22.32 KG/M2 | TEMPERATURE: 98 F | WEIGHT: 126 LBS | HEIGHT: 63 IN | SYSTOLIC BLOOD PRESSURE: 120 MMHG | OXYGEN SATURATION: 99 % | HEART RATE: 62 BPM

## 2024-12-12 DIAGNOSIS — R42 DIZZINESS: ICD-10-CM

## 2024-12-12 DIAGNOSIS — M54.31 SCIATICA OF RIGHT SIDE: ICD-10-CM

## 2024-12-12 DIAGNOSIS — M25.561 CHRONIC PAIN OF RIGHT KNEE: Primary | ICD-10-CM

## 2024-12-12 DIAGNOSIS — G89.29 CHRONIC PAIN OF RIGHT KNEE: Primary | ICD-10-CM

## 2024-12-12 PROBLEM — M54.50 LOW BACK PAIN: Status: RESOLVED | Noted: 2023-09-08 | Resolved: 2024-12-12

## 2024-12-12 PROBLEM — R25.2 MUSCLE CRAMPS: Status: RESOLVED | Noted: 2023-09-08 | Resolved: 2024-12-12

## 2024-12-12 PROBLEM — M54.2 NECK PAIN: Status: RESOLVED | Noted: 2023-09-08 | Resolved: 2024-12-12

## 2024-12-12 PROBLEM — S29.012A STRAIN OF LATISSIMUS DORSI MUSCLE: Status: RESOLVED | Noted: 2023-09-08 | Resolved: 2024-12-12

## 2024-12-12 PROBLEM — S46.812A STRAIN OF LEFT TRAPEZIUS MUSCLE: Status: RESOLVED | Noted: 2023-09-08 | Resolved: 2024-12-12

## 2024-12-12 PROBLEM — S80.02XA CONTUSION OF LEFT KNEE: Status: RESOLVED | Noted: 2023-09-08 | Resolved: 2024-12-12

## 2024-12-12 PROBLEM — R10.31 RIGHT GROIN PAIN: Status: RESOLVED | Noted: 2023-09-08 | Resolved: 2024-12-12

## 2024-12-12 PROBLEM — Z00.00 HEALTHCARE MAINTENANCE: Status: RESOLVED | Noted: 2024-03-18 | Resolved: 2024-12-12

## 2024-12-12 PROBLEM — M48.02 STENOSIS OF CERVICAL SPINE: Status: RESOLVED | Noted: 2023-09-11 | Resolved: 2024-12-12

## 2024-12-12 PROBLEM — I95.1 ORTHOSTASIS: Status: RESOLVED | Noted: 2023-09-08 | Resolved: 2024-12-12

## 2024-12-12 PROBLEM — M25.562 ACUTE PAIN OF LEFT KNEE: Status: RESOLVED | Noted: 2023-09-08 | Resolved: 2024-12-12

## 2024-12-12 PROBLEM — R26.0 ATAXIC GAIT: Status: RESOLVED | Noted: 2023-09-08 | Resolved: 2024-12-12

## 2024-12-12 PROCEDURE — 1159F MED LIST DOCD IN RCRD: CPT | Performed by: NURSE PRACTITIONER

## 2024-12-12 PROCEDURE — 99214 OFFICE O/P EST MOD 30 MIN: CPT | Performed by: NURSE PRACTITIONER

## 2024-12-12 PROCEDURE — 3008F BODY MASS INDEX DOCD: CPT | Performed by: NURSE PRACTITIONER

## 2024-12-12 PROCEDURE — 3074F SYST BP LT 130 MM HG: CPT | Performed by: NURSE PRACTITIONER

## 2024-12-12 PROCEDURE — 1125F AMNT PAIN NOTED PAIN PRSNT: CPT | Performed by: NURSE PRACTITIONER

## 2024-12-12 PROCEDURE — 1036F TOBACCO NON-USER: CPT | Performed by: NURSE PRACTITIONER

## 2024-12-12 PROCEDURE — 3078F DIAST BP <80 MM HG: CPT | Performed by: NURSE PRACTITIONER

## 2024-12-12 ASSESSMENT — PAIN SCALES - GENERAL: PAINLEVEL_OUTOF10: 5

## 2024-12-12 ASSESSMENT — ENCOUNTER SYMPTOMS
CARDIOVASCULAR NEGATIVE: 1
MUSCULOSKELETAL NEGATIVE: 1
GASTROINTESTINAL NEGATIVE: 1
RESPIRATORY NEGATIVE: 1
CONSTITUTIONAL NEGATIVE: 1

## 2024-12-12 ASSESSMENT — PATIENT HEALTH QUESTIONNAIRE - PHQ9
1. LITTLE INTEREST OR PLEASURE IN DOING THINGS: NOT AT ALL
2. FEELING DOWN, DEPRESSED OR HOPELESS: NOT AT ALL
SUM OF ALL RESPONSES TO PHQ9 QUESTIONS 1 AND 2: 0

## 2024-12-12 NOTE — PROGRESS NOTES
"Chief Complaint  Maddie Flynn is a 64 y.o. female presenting for \"Knee Pain (Dr Gordon transfer- pt is here for sciatic pain in right hip and it is affecting her knee, pt states she has to wear a brace. Thinks it is starting to affect left side as well).\"    HPI     Maddie Flynn is a 64 y.o. female presenting for right knee pain, needs a brace on it at all times.  She has issues with constipation, takes tumeric.  She has seen a spinal doctor for sciatica and she has tried accupuncture, she would like to try accupuncture.      She has issues with balance and has seen three different neurologists and allergist, and a pulmonologist.          Past Medical History  Patient Active Problem List    Diagnosis Date Noted    Healthcare maintenance 03/18/2024    Isthmic spondylolisthesis 10/11/2023    Stenosis of cervical spine 09/11/2023    Encounter for screening mammogram for malignant neoplasm of breast 09/11/2023    Chronic pain of both knees 09/11/2023    Allergic rhinitis 09/08/2023    Arthritis 09/08/2023    Ataxic gait 09/08/2023    Benign essential hypertension 09/08/2023    Benign paroxysmal positional vertigo 09/08/2023    Bradycardia, drug induced 09/08/2023    Chronic kidney disease, stage 2 (mild) 09/08/2023    Chronic constipation 09/08/2023    Contusion of left knee 09/08/2023    Degenerative disc disease, cervical 09/08/2023    Diaphoresis 09/08/2023    Dizziness 09/08/2023    Flat feet, bilateral 09/08/2023    Hemorrhoids 09/08/2023    Hypercholesterolemia 09/08/2023    Impacted cerumen of both ears 09/08/2023    Impairment of balance 09/08/2023    Acute pain of left knee 09/08/2023    Leukopenia 09/08/2023    Muscle cramps 09/08/2023    Mycotic toenails 09/08/2023    Neck pain 09/08/2023    Orthostasis 09/08/2023    Pain, joint, shoulder 09/08/2023    Pes planus 09/08/2023    Piriformis syndrome of right side 09/08/2023    Rib pain on right side 09/08/2023    Right groin pain 09/08/2023    Segmental and " somatic dysfunction 09/08/2023    Cervical radiculopathy 09/08/2023    Strain of latissimus dorsi muscle 09/08/2023    Strain of left trapezius muscle 09/08/2023    Low back pain 09/08/2023    Toe pain 09/08/2023    Vitamin D deficiency 09/08/2023    Xerosis cutis 09/08/2023    Other benign neoplasm of skin of left lower limb, including hip 05/15/2023    Vestibulopathy 04/15/2011    Abnormality of gait 04/15/2011    Cervicalgia 04/15/2011        Medications  Current Outpatient Medications   Medication Instructions    amLODIPine (NORVASC) 10 mg, oral, Daily    loratadine (CLARITIN) 10 mg, Daily    multivitamin-min-FA-ginkgo (One Daily Women 50 Plus) 400-120 mcg-mg tablet 1 tablet, Daily    psyllium (Metamucil Fiber Singles) 3.4 gram packet Daily    turmeric, bulk, 95 % powder Take by mouth.        Surgical History  She has a past surgical history that includes Incision and drainage of wound (05/15/2015).     Social History  She reports that she has never smoked. She has never been exposed to tobacco smoke. She has never used smokeless tobacco. She reports that she does not currently use alcohol. She reports that she does not use drugs.    Family History  Family History   Problem Relation Name Age of Onset    Other (Other) Mother          sepsis    Heart disease Mother      Hypertension Mother      Alzheimer's disease Mother      Coronary artery disease Mother      Kidney disease Father      Heart disease Father      Cardiomyopathy Father      Heart failure Father      Dementia Father      Seizures Father      Dementia Sister      Thyroid disease Sister      Kidney disease Brother      Dementia Brother      Other (end stage renal disease) Brother          Allergies  Animal dander and House dust mite    ROS  Review of Systems   Constitutional: Negative.    Respiratory: Negative.     Cardiovascular: Negative.    Gastrointestinal: Negative.    Genitourinary: Negative.    Musculoskeletal: Negative.         Last Recorded  Vitals  /76 (BP Location: Right arm, Patient Position: Sitting, BP Cuff Size: Adult)   Pulse 62   Temp 36.7 °C (98 °F)   Resp 18   Wt 57.2 kg (126 lb)   SpO2 99%     Physical Exam  Vitals and nursing note reviewed.   Constitutional:       Appearance: Normal appearance.   Eyes:      Pupils: Pupils are equal, round, and reactive to light.   Cardiovascular:      Rate and Rhythm: Normal rate and regular rhythm.      Pulses: Normal pulses.      Heart sounds: Normal heart sounds.   Pulmonary:      Effort: Pulmonary effort is normal.      Breath sounds: Normal breath sounds.   Neurological:      Mental Status: She is alert.         Relevant Results      Assessment/Plan   There are no diagnoses linked to this encounter.        COUNSELING      Medication education:              Education:  The patient is counseled regarding potential side-effects of any and all new medications             Understanding:  Patient expressed understanding             Adherence:  No barriers to adherence identified        Alysha King, APRN-CNP

## 2024-12-29 ASSESSMENT — PROMIS GLOBAL HEALTH SCALE
RATE_QUALITY_OF_LIFE: EXCELLENT
RATE_SOCIAL_SATISFACTION: EXCELLENT
CARRYOUT_PHYSICAL_ACTIVITIES: COMPLETELY
CARRYOUT_SOCIAL_ACTIVITIES: EXCELLENT
RATE_GENERAL_HEALTH: VERY GOOD
EMOTIONAL_PROBLEMS: RARELY
RATE_AVERAGE_PAIN: 5
RATE_AVERAGE_FATIGUE: MILD
RATE_PHYSICAL_HEALTH: VERY GOOD
RATE_MENTAL_HEALTH: VERY GOOD

## 2024-12-31 ENCOUNTER — LAB (OUTPATIENT)
Dept: LAB | Facility: LAB | Age: 64
End: 2024-12-31
Payer: MEDICARE

## 2024-12-31 ENCOUNTER — OFFICE VISIT (OUTPATIENT)
Dept: PRIMARY CARE | Facility: CLINIC | Age: 64
End: 2024-12-31
Payer: MEDICARE

## 2024-12-31 VITALS
TEMPERATURE: 98.9 F | HEART RATE: 53 BPM | DIASTOLIC BLOOD PRESSURE: 66 MMHG | OXYGEN SATURATION: 99 % | BODY MASS INDEX: 22.32 KG/M2 | SYSTOLIC BLOOD PRESSURE: 124 MMHG | WEIGHT: 126 LBS | HEIGHT: 63 IN | RESPIRATION RATE: 18 BRPM

## 2024-12-31 DIAGNOSIS — I10 BENIGN ESSENTIAL HYPERTENSION: ICD-10-CM

## 2024-12-31 DIAGNOSIS — E78.00 HYPERCHOLESTEROLEMIA: ICD-10-CM

## 2024-12-31 DIAGNOSIS — Z12.31 ENCOUNTER FOR SCREENING MAMMOGRAM FOR MALIGNANT NEOPLASM OF BREAST: ICD-10-CM

## 2024-12-31 DIAGNOSIS — Z00.00 WELCOME TO MEDICARE PREVENTIVE VISIT: Primary | ICD-10-CM

## 2024-12-31 LAB
ALBUMIN SERPL BCP-MCNC: 4.6 G/DL (ref 3.4–5)
ALP SERPL-CCNC: 92 U/L (ref 33–136)
ALT SERPL W P-5'-P-CCNC: 24 U/L (ref 7–45)
ANION GAP SERPL CALCULATED.3IONS-SCNC: 10 MMOL/L (ref 10–20)
AST SERPL W P-5'-P-CCNC: 28 U/L (ref 9–39)
BILIRUB SERPL-MCNC: 0.7 MG/DL (ref 0–1.2)
BUN SERPL-MCNC: 15 MG/DL (ref 6–23)
CALCIUM SERPL-MCNC: 9.5 MG/DL (ref 8.6–10.3)
CHLORIDE SERPL-SCNC: 107 MMOL/L (ref 98–107)
CHOLEST SERPL-MCNC: 250 MG/DL (ref 0–199)
CHOLEST/HDLC SERPL: 2.9 {RATIO}
CO2 SERPL-SCNC: 28 MMOL/L (ref 21–32)
CREAT SERPL-MCNC: 0.86 MG/DL (ref 0.5–1.05)
EGFRCR SERPLBLD CKD-EPI 2021: 75 ML/MIN/1.73M*2
GLUCOSE SERPL-MCNC: 90 MG/DL (ref 74–99)
HDLC SERPL-MCNC: 85.6 MG/DL
LDLC SERPL CALC-MCNC: 151 MG/DL
NON HDL CHOLESTEROL: 164 MG/DL (ref 0–149)
POTASSIUM SERPL-SCNC: 3.9 MMOL/L (ref 3.5–5.3)
PROT SERPL-MCNC: 7.2 G/DL (ref 6.4–8.2)
SODIUM SERPL-SCNC: 141 MMOL/L (ref 136–145)
TRIGL SERPL-MCNC: 67 MG/DL (ref 0–149)
VLDL: 13 MG/DL (ref 0–40)

## 2024-12-31 PROCEDURE — 3079F DIAST BP 80-89 MM HG: CPT | Performed by: NURSE PRACTITIONER

## 2024-12-31 PROCEDURE — G0439 PPPS, SUBSEQ VISIT: HCPCS | Performed by: NURSE PRACTITIONER

## 2024-12-31 PROCEDURE — 1126F AMNT PAIN NOTED NONE PRSNT: CPT | Performed by: NURSE PRACTITIONER

## 2024-12-31 PROCEDURE — 1036F TOBACCO NON-USER: CPT | Performed by: NURSE PRACTITIONER

## 2024-12-31 PROCEDURE — 1159F MED LIST DOCD IN RCRD: CPT | Performed by: NURSE PRACTITIONER

## 2024-12-31 PROCEDURE — 3078F DIAST BP <80 MM HG: CPT | Performed by: NURSE PRACTITIONER

## 2024-12-31 PROCEDURE — 1160F RVW MEDS BY RX/DR IN RCRD: CPT | Performed by: NURSE PRACTITIONER

## 2024-12-31 PROCEDURE — 93010 ELECTROCARDIOGRAM REPORT: CPT | Performed by: NURSE PRACTITIONER

## 2024-12-31 PROCEDURE — 80061 LIPID PANEL: CPT

## 2024-12-31 PROCEDURE — 99215 OFFICE O/P EST HI 40 MIN: CPT | Mod: 25 | Performed by: NURSE PRACTITIONER

## 2024-12-31 PROCEDURE — 1124F ACP DISCUSS-NO DSCNMKR DOCD: CPT | Performed by: NURSE PRACTITIONER

## 2024-12-31 PROCEDURE — 1125F AMNT PAIN NOTED PAIN PRSNT: CPT | Performed by: NURSE PRACTITIONER

## 2024-12-31 PROCEDURE — 80053 COMPREHEN METABOLIC PANEL: CPT

## 2024-12-31 PROCEDURE — 99397 PER PM REEVAL EST PAT 65+ YR: CPT | Performed by: NURSE PRACTITIONER

## 2024-12-31 PROCEDURE — 3008F BODY MASS INDEX DOCD: CPT | Performed by: NURSE PRACTITIONER

## 2024-12-31 PROCEDURE — 3074F SYST BP LT 130 MM HG: CPT | Performed by: NURSE PRACTITIONER

## 2024-12-31 PROCEDURE — 93005 ELECTROCARDIOGRAM TRACING: CPT | Performed by: NURSE PRACTITIONER

## 2024-12-31 RX ORDER — AMLODIPINE BESYLATE 10 MG/1
10 TABLET ORAL DAILY
Qty: 90 TABLET | Refills: 1 | Status: SHIPPED | OUTPATIENT
Start: 2024-12-31

## 2024-12-31 ASSESSMENT — PAIN SCALES - GENERAL: PAINLEVEL_OUTOF10: 2

## 2024-12-31 ASSESSMENT — PATIENT HEALTH QUESTIONNAIRE - PHQ9
2. FEELING DOWN, DEPRESSED OR HOPELESS: NOT AT ALL
SUM OF ALL RESPONSES TO PHQ9 QUESTIONS 1 AND 2: 0
1. LITTLE INTEREST OR PLEASURE IN DOING THINGS: NOT AT ALL

## 2024-12-31 NOTE — PROGRESS NOTES
History Of Present Illness  Maddie Flynn is a 64 y.o. female presenting for a wellness exam.    Preventative screenings:  Due for mammo and labs.     Other medical issues/concerns:   Bradycardia     Patient Care Team:  KRISTA Fine as PCP - General (Family Medicine)  Facundo Gordon MD as PCP - [employer] PCP     General health: Excellent   Exercise: Yes daily   Caffeine: Occasional    Diet: Balanced    Functional ability:   No cognitive impairment observed.    No cognitive impairment reported by patient or family.    ADL screening:  Hearing without difficulties.  Independent in bathing, dressing, walking in home    IADL screening:  Independent in managing finances, grocery shopping, taking medications, doing housework    Home Safety:   [place] Home safety risk factors: No loose rugs, poor lighting, stairs without rails, bathroom with no grab bars    Depression screening:  Patient Health Questionnaire-2 Score: 0     CARDIAC SCREENING   The 10-year ASCVD risk score (Ivan VERNON, et al., 2019) is: 9.4%    Values used to calculate the score:      Age: 64 years      Sex: Female      Is Non- : Yes      Diabetic: No      Tobacco smoker: No      Systolic Blood Pressure: 124 mmHg      Is BP treated: Yes      HDL Cholesterol: 85.6 mg/dL      Total Cholesterol: 250 mg/dL       Past Medical History  Patient Active Problem List    Diagnosis Date Noted    Isthmic spondylolisthesis 10/11/2023    Encounter for screening mammogram for malignant neoplasm of breast 09/11/2023    Chronic pain of both knees 09/11/2023    Allergic rhinitis 09/08/2023    Arthritis 09/08/2023    Benign essential hypertension 09/08/2023    Benign paroxysmal positional vertigo 09/08/2023    Bradycardia, drug induced 09/08/2023    Chronic kidney disease, stage 2 (mild) 09/08/2023    Chronic constipation 09/08/2023    Degenerative disc disease, cervical 09/08/2023    Diaphoresis 09/08/2023    Flat feet,  bilateral 09/08/2023    Hemorrhoids 09/08/2023    Hypercholesterolemia 09/08/2023    Impacted cerumen of both ears 09/08/2023    Impairment of balance 09/08/2023    Leukopenia 09/08/2023    Mycotic toenails 09/08/2023    Pain, joint, shoulder 09/08/2023    Pes planus 09/08/2023    Piriformis syndrome of right side 09/08/2023    Rib pain on right side 09/08/2023    Segmental and somatic dysfunction 09/08/2023    Cervical radiculopathy 09/08/2023    Toe pain 09/08/2023    Vitamin D deficiency 09/08/2023    Xerosis cutis 09/08/2023    Other benign neoplasm of skin of left lower limb, including hip 05/15/2023    Vestibulopathy 04/15/2011    Abnormality of gait 04/15/2011    Cervicalgia 04/15/2011        Medications  Medications and current supplements were reviewed and recorded.   Does the patient use opiate medications:  NO . If yes, do they take medication appropriately: NA.  Current Outpatient Medications   Medication Sig Dispense Refill    loratadine (Claritin) 10 mg tablet Take 1 tablet (10 mg) by mouth once daily.      multivitamin-min-FA-ginkgo (One Daily Women 50 Plus) 400-120 mcg-mg tablet Take 1 tablet by mouth once daily.      psyllium (Metamucil Fiber Singles) 3.4 gram packet Take by mouth once daily.      turmeric, bulk, 95 % powder Take by mouth.      amLODIPine (Norvasc) 10 mg tablet Take 1 tablet (10 mg) by mouth once daily. 90 tablet 1     No current facility-administered medications for this visit.        Surgical History  She has a past surgical history that includes Incision and drainage of wound (05/15/2015) and Tubal ligation (2/7/1989).     Social History  She reports that she has never smoked. She has never been exposed to tobacco smoke. She has never used smokeless tobacco. She reports that she does not currently use alcohol. She reports that she does not use drugs.    Family History  Family History   Problem Relation Name Age of Onset    Other (Other) Mother Evita         sepsis    Heart  "disease Mother Evita     Hypertension Mother Evita     Alzheimer's disease Mother Evita     Coronary artery disease Mother Evita     Kidney disease Father B F     Heart disease Father B F     Cardiomyopathy Father B F     Heart failure Father B F     Dementia Father B F     Seizures Father B F     Arthritis Father B F     Hearing loss Father B F     Stroke Father B F     Dementia Sister Janeth     Thyroid disease Sister Janeth     Diabetes Sister Janeth     Kidney disease Sister Janeth     Kidney disease Brother Hero     Dementia Brother Hero     Other (end stage renal disease) Brother Hero     Hernia Brother Manuel     Kidney disease Sister Janae     Vision loss Mother's Sister Sandra         Allergies  Animal dander, House dust mite, and Meclizine    Immunizations  Immunization History   Administered Date(s) Administered    Flu vaccine (IIV4), preservative free *Check age/dose* 09/13/2021, 10/17/2022, 09/11/2023    Influenza, Unspecified 09/14/2011, 08/20/2012, 11/04/2013, 10/20/2014    Influenza, seasonal, injectable 10/15/2018, 10/18/2019, 09/13/2021    MMR vaccine, subcutaneous (MMR II) 06/06/2003, 06/10/2003    Moderna SARS-CoV-2 Vaccination 01/10/2021, 02/08/2021, 11/16/2021    PPD Test 05/02/2006    Pneumococcal Conjugate PCV 7 1959    Tdap vaccine, age 7 year and older (BOOSTRIX, ADACEL) 08/20/2012    Zoster vaccine, recombinant, adult (SHINGRIX) 03/18/2024, 05/13/2024        ROS  Negative, except as discussed in HPI     Vitals  /66 (BP Location: Right arm, Patient Position: Sitting, BP Cuff Size: Adult)   Pulse 53   Temp 37.2 °C (98.9 °F)   Resp 18   Ht 1.6 m (5' 3\")   Wt 57.2 kg (126 lb)   LMP  (LMP Unknown)   SpO2 99%   BMI 22.32 kg/m²      Physical Exam  Vitals and nursing note reviewed.   Constitutional:       Appearance: Normal appearance.   HENT:      Head: Normocephalic and atraumatic.      Right Ear: Tympanic membrane, ear canal and external ear normal.      " Left Ear: Tympanic membrane, ear canal and external ear normal.      Nose: Nose normal.      Mouth/Throat:      Mouth: Mucous membranes are moist.      Pharynx: Oropharynx is clear.   Eyes:      Extraocular Movements: Extraocular movements intact.      Conjunctiva/sclera: Conjunctivae normal.      Pupils: Pupils are equal, round, and reactive to light.   Neck:      Thyroid: No thyromegaly.   Cardiovascular:      Rate and Rhythm: Regular rhythm. Bradycardia present.      Pulses: Normal pulses.      Heart sounds: Normal heart sounds, S1 normal and S2 normal.   Pulmonary:      Effort: Pulmonary effort is normal.      Breath sounds: Normal breath sounds. No wheezing or rhonchi.   Abdominal:      General: Bowel sounds are normal.      Palpations: Abdomen is soft. There is no mass.      Tenderness: There is no abdominal tenderness. There is no guarding.   Genitourinary:     Comments: Not examined  Musculoskeletal:         General: Normal range of motion.      Cervical back: Normal range of motion.      Right lower leg: No edema.      Left lower leg: No edema.   Lymphadenopathy:      Cervical: No cervical adenopathy.   Skin:     General: Skin is warm and dry.      Capillary Refill: Capillary refill takes less than 2 seconds.      Findings: No rash.   Neurological:      General: No focal deficit present.      Mental Status: She is alert and oriented to person, place, and time. Mental status is at baseline.      Cranial Nerves: Cranial nerves 2-12 are intact. No cranial nerve deficit.      Sensory: Sensation is intact.      Motor: Motor function is intact.      Coordination: Coordination is intact.      Gait: Gait is intact.   Psychiatric:         Mood and Affect: Mood normal.         Behavior: Behavior normal.         Thought Content: Thought content normal.         Judgment: Judgment normal.         Relevant Results  Lab Results   Component Value Date    WBC 3.6 (L) 03/18/2024    WBC 2.9 (L) 09/11/2023    HGB 14.4  03/18/2024    HGB 14.0 09/11/2023    HCT 46.1 (H) 03/18/2024    HCT 44.6 09/11/2023    MCV 94 03/18/2024    MCV 96 09/11/2023     03/18/2024     09/11/2023     Lab Results   Component Value Date     12/31/2024     03/18/2024    K 3.9 12/31/2024    K 4.0 03/18/2024     12/31/2024     03/18/2024    CO2 28 12/31/2024    CO2 33 (H) 03/18/2024    BUN 15 12/31/2024    BUN 15 03/18/2024    CREATININE 0.86 12/31/2024    CREATININE 0.85 03/18/2024    CALCIUM 9.5 12/31/2024    CALCIUM 10.2 03/18/2024    PROT 7.2 12/31/2024    PROT 7.5 03/18/2024    BILITOT 0.7 12/31/2024    BILITOT 0.6 03/18/2024    ALKPHOS 92 12/31/2024    ALKPHOS 103 03/18/2024    ALT 24 12/31/2024    ALT 16 03/18/2024    AST 28 12/31/2024    AST 20 03/18/2024    GLUCOSE 90 12/31/2024    GLUCOSE 64 (L) 03/18/2024     Lab Results   Component Value Date    HGBA1C 5.2 03/18/2024     Lab Results   Component Value Date    TSH 2.96 03/18/2024    FREET4 1.33 05/29/2020      Lab Results   Component Value Date    CHOL 250 (H) 12/31/2024    TRIG 67 12/31/2024    HDL 85.6 12/31/2024    LDLDIRECT 124 03/18/2024           Assessment/Plan   Maddie was seen today for annual exam.  Diagnoses and all orders for this visit:  Welcome to Medicare preventive visit (Primary)  -     ECG 12 lead (Clinic Performed)  Benign essential hypertension  -     Comprehensive Metabolic Panel; Future  -     amLODIPine (Norvasc) 10 mg tablet; Take 1 tablet (10 mg) by mouth once daily.  Hypercholesterolemia  -     Lipid Panel; Future  Encounter for screening mammogram for malignant neoplasm of breast  -     BI mammo bilateral screening tomosynthesis; Future       Medications Discontinued During This Encounter   Medication Reason    amLODIPine (Norvasc) 10 mg tablet Reorder        Counseling:   Medication education:   -Education:  The patient is counseled regarding potential side-effects of any and all new medications  -Understanding:  Patient expressed  understanding of information discussed today  -Adherence:  No barriers to adherence identified    Advanced care planning: Discussed including healthcare power of  as well as specific end-of-life choices and/or directives was discussed with the patient , voluntarily, and advance care decision was documented in the patient's record.     Final treatment plan is a result of shared decision making with patient.         Alysha Larsenec, APRN-CNP         Tobacco/Alcohol/Opioid use, as well as Illicit Drug Use was screened for/reviewed and documented in Social Documentation section of the chart and medication list as appropriate    Depression Screening  Depression screening completed using the PHQ-2 questions with results documented in the chart/encounter (15m).  (See Rooming Screening section for documentation, or note for additional information)    Cardiac Risk Assessment  Cardiovascular risk was discussed and, if needed, lifestyle modifications recommended, including nutritional choices, exercise, and elimination of habits contributing to risk.   We agreed on a plan to reduce the current cardiovascular risk based on above discussion as needed.     Aspirin use/disuse was discussed and documented in the Problem List of the medical record (under Cardiac Risk Counseling) after reviewing the updated guidelines below:  Consider low dose Aspirin ( mg) use if the benefit for cardiovascular disease prevention outweighs risk for bleeding complications.   In general, low dose ASA should be considered:  In patients WITHOUT prior MI/stroke/PAD (primary prevention):   a. Age <60: Use if 10-year cardiovascular disease risk >20%, with discussion of risks and benefits with patient  b. Age 60-<70: Use if 10-year cardiovascular disease risk >20% and low bleeding (e.g., gastrointestinal) risk, with discussion of risks and benefits with patient  c. Age >=70: Do not use    In patients WITH prior MI/stroke/PAD (secondary  prevention):   Generally use unless extremely high bleeding (e.g., gastrointenstinal) risk, with discussion of risks and benefits with patient    Advance Directives Discussion  Advanced Care Planning (including a Living Will, Healthcare POA, as well as specific end of life choices and/or directives), was discussed with the patient and/or surrogate, voluntarily, and details of that discussion documented in the Problem List (under Advanced Directives Discussion) of the medical record.    (~16 min spent discussing above)     During the course of the visit the patient was educated and counseled about age appropriate screening and preventive services. Completed preventive screenings were documented in the chart and orders were placed for outstanding screenings/procedures as documented in the Assessment and Plan.    Patient Instructions (the written plan) was given to the patient at check out.

## 2025-01-14 ENCOUNTER — HOSPITAL ENCOUNTER (OUTPATIENT)
Dept: RADIOLOGY | Facility: CLINIC | Age: 65
Discharge: HOME | End: 2025-01-14
Payer: MEDICARE

## 2025-01-14 VITALS — WEIGHT: 126 LBS | BODY MASS INDEX: 22.32 KG/M2 | HEIGHT: 63 IN

## 2025-01-14 DIAGNOSIS — Z12.31 ENCOUNTER FOR SCREENING MAMMOGRAM FOR MALIGNANT NEOPLASM OF BREAST: ICD-10-CM

## 2025-01-14 PROCEDURE — 77063 BREAST TOMOSYNTHESIS BI: CPT | Performed by: STUDENT IN AN ORGANIZED HEALTH CARE EDUCATION/TRAINING PROGRAM

## 2025-01-14 PROCEDURE — 77067 SCR MAMMO BI INCL CAD: CPT

## 2025-01-14 PROCEDURE — 77067 SCR MAMMO BI INCL CAD: CPT | Performed by: STUDENT IN AN ORGANIZED HEALTH CARE EDUCATION/TRAINING PROGRAM

## 2025-01-20 ENCOUNTER — TELEPHONE (OUTPATIENT)
Dept: PRIMARY CARE | Facility: CLINIC | Age: 65
End: 2025-01-20
Payer: MEDICARE

## 2025-01-21 NOTE — TELEPHONE ENCOUNTER
Alysha VILLA [facility], APRN-CNP  1/14/2025  9:35 AM EST        Patient's total cholesterol has gone up from 234 to 250 we want this below 200 her LDL has also gone up this is her bad cholesterol is gone from 119 to 151 she needs to follow a low-fat low-cholesterol diet exercise daily she is not quite at the point where she needs to take medication but inching upward her metabolic panel was good this checks kidney function liver function and fasting blood sugar

## 2025-06-03 ENCOUNTER — OFFICE VISIT (OUTPATIENT)
Dept: PRIMARY CARE | Facility: CLINIC | Age: 65
End: 2025-06-03
Payer: MEDICARE

## 2025-06-03 VITALS
OXYGEN SATURATION: 99 % | SYSTOLIC BLOOD PRESSURE: 120 MMHG | HEIGHT: 63 IN | BODY MASS INDEX: 22.32 KG/M2 | TEMPERATURE: 97.2 F | RESPIRATION RATE: 18 BRPM | DIASTOLIC BLOOD PRESSURE: 84 MMHG | HEART RATE: 55 BPM | WEIGHT: 126 LBS

## 2025-06-03 DIAGNOSIS — Z00.00 ANNUAL WELLNESS VISIT: Primary | ICD-10-CM

## 2025-06-03 DIAGNOSIS — I10 BENIGN ESSENTIAL HYPERTENSION: ICD-10-CM

## 2025-06-03 DIAGNOSIS — E78.00 HYPERCHOLESTEROLEMIA: ICD-10-CM

## 2025-06-03 DIAGNOSIS — J30.2 SEASONAL ALLERGIES: ICD-10-CM

## 2025-06-03 PROBLEM — R07.81 RIB PAIN ON RIGHT SIDE: Status: RESOLVED | Noted: 2023-09-08 | Resolved: 2025-06-03

## 2025-06-03 PROBLEM — B35.1 MYCOTIC TOENAILS: Status: RESOLVED | Noted: 2023-09-08 | Resolved: 2025-06-03

## 2025-06-03 PROBLEM — Z12.31 ENCOUNTER FOR SCREENING MAMMOGRAM FOR MALIGNANT NEOPLASM OF BREAST: Status: RESOLVED | Noted: 2023-09-11 | Resolved: 2025-06-03

## 2025-06-03 PROCEDURE — 99387 INIT PM E/M NEW PAT 65+ YRS: CPT | Performed by: NURSE PRACTITIONER

## 2025-06-03 PROCEDURE — 99215 OFFICE O/P EST HI 40 MIN: CPT | Performed by: NURSE PRACTITIONER

## 2025-06-03 RX ORDER — LORATADINE 10 MG/1
10 TABLET ORAL DAILY
Qty: 90 TABLET | Refills: 1 | Status: SHIPPED | OUTPATIENT
Start: 2025-06-03

## 2025-06-03 RX ORDER — AMLODIPINE BESYLATE 10 MG/1
10 TABLET ORAL DAILY
Qty: 90 TABLET | Refills: 1 | Status: SHIPPED | OUTPATIENT
Start: 2025-06-03

## 2025-06-03 ASSESSMENT — PAIN SCALES - GENERAL: PAINLEVEL_OUTOF10: 2

## 2025-06-03 NOTE — PROGRESS NOTES
History Of Present Illness  Maddie Flynn is a 65 y.o. female presenting for a wellness exam.    Patient is here for her annual wellness exam as well as lab work    Does have high cholesterol will check labs today patient's blood pressure is well-controlled on her medication    Patient Care Team:  KRISTA Fine as PCP - General (Family Medicine)  KRISTA Fine as PCP - [employer] PCP     General health: Good  Exercise: Balanced  Caffeine: Limited   Diet: Balanced    Functional ability:   No cognitive impairment observed.    No cognitive impairment reported by patient or family.    ADL screening:  Hearing without difficulties.  Independent in bathing, dressing, walking in home    IADL screening:  Independent in managing finances, grocery shopping, taking medications, doing housework    Home Safety:   [place] Home safety risk factors: No loose rugs, poor lighting, stairs without rails, bathroom with no grab bars    Depression screening:  Patient Health Questionnaire-2 Score: 0     CARDIAC SCREENING   The 10-year ASCVD risk score (Ivan VERNON, et al., 2019) is: 9.5%    Values used to calculate the score:      Age: 65 years      Sex: Female      Is Non- : Yes      Diabetic: No      Tobacco smoker: No      Systolic Blood Pressure: 120 mmHg      Is BP treated: Yes      HDL Cholesterol: 85.6 mg/dL      Total Cholesterol: 250 mg/dL       Past Medical History  Patient Active Problem List    Diagnosis Date Noted    Isthmic spondylolisthesis 10/11/2023    Chronic pain of both knees 09/11/2023    Allergic rhinitis 09/08/2023    Arthritis 09/08/2023    Benign essential hypertension 09/08/2023    Benign paroxysmal positional vertigo 09/08/2023    Bradycardia, drug induced 09/08/2023    Chronic kidney disease, stage 2 (mild) 09/08/2023    Chronic constipation 09/08/2023    Degenerative disc disease, cervical 09/08/2023    Diaphoresis 09/08/2023    Flat feet, bilateral  09/08/2023    Hemorrhoids 09/08/2023    Hypercholesterolemia 09/08/2023    Impacted cerumen of both ears 09/08/2023    Impairment of balance 09/08/2023    Leukopenia 09/08/2023    Pain, joint, shoulder 09/08/2023    Pes planus 09/08/2023    Piriformis syndrome of right side 09/08/2023    Segmental and somatic dysfunction 09/08/2023    Cervical radiculopathy 09/08/2023    Toe pain 09/08/2023    Vitamin D deficiency 09/08/2023    Xerosis cutis 09/08/2023    Other benign neoplasm of skin of left lower limb, including hip 05/15/2023    Vestibulopathy 04/15/2011    Abnormality of gait 04/15/2011    Cervicalgia 04/15/2011        Medications  Medications and current supplements were reviewed and recorded.   Does the patient use opiate medications: No. If yes, do they take medication appropriately: N/A.  Medications ordered prior to the current encounter[1]     Surgical History  She has a past surgical history that includes Incision and drainage of wound (05/15/2015) and Tubal ligation (2/7/1989).     Social History  She reports that she has never smoked. She has never been exposed to tobacco smoke. She has never used smokeless tobacco. She reports that she does not currently use alcohol. She reports that she does not use drugs.    Family History  Family History[2]     Allergies  Animal dander, House dust mite, and Meclizine    Immunizations  Immunization History   Administered Date(s) Administered    Flu vaccine (IIV4), preservative free *Check age/dose* 09/13/2021, 10/17/2022, 09/11/2023    Influenza, Unspecified 09/14/2011, 08/20/2012, 11/04/2013, 10/20/2014    Influenza, seasonal, injectable 10/15/2018, 10/18/2019, 09/13/2021    MMR vaccine, subcutaneous (MMR II) 06/06/2003, 06/10/2003    Moderna SARS-CoV-2 Vaccination 01/10/2021, 02/08/2021, 11/16/2021    PPD Test 05/02/2006    Pneumococcal Conjugate PCV 7 1959    Tdap vaccine, age 7 year and older (BOOSTRIX, ADACEL) 08/20/2012    Zoster vaccine, recombinant,  "adult (SHINGRIX) 03/18/2024, 05/13/2024        ROS  Negative, except as discussed in HPI     Vitals  /84 (BP Location: Left arm, Patient Position: Sitting, BP Cuff Size: Small adult)   Pulse 55   Temp 36.2 °C (97.2 °F)   Resp 18   Ht 1.6 m (5' 3\")   Wt 57.2 kg (126 lb)   LMP  (LMP Unknown)   SpO2 99%   BMI 22.32 kg/m²      Physical Exam  Vitals and nursing note reviewed.   Constitutional:       Appearance: Normal appearance.   HENT:      Head: Normocephalic and atraumatic.      Right Ear: Tympanic membrane, ear canal and external ear normal.      Left Ear: Tympanic membrane, ear canal and external ear normal.      Nose: Nose normal.      Mouth/Throat:      Mouth: Mucous membranes are moist.      Pharynx: Oropharynx is clear.   Eyes:      Extraocular Movements: Extraocular movements intact.      Conjunctiva/sclera: Conjunctivae normal.      Pupils: Pupils are equal, round, and reactive to light.   Neck:      Thyroid: No thyromegaly.   Cardiovascular:      Rate and Rhythm: Normal rate and regular rhythm.      Pulses: Normal pulses.      Heart sounds: Normal heart sounds, S1 normal and S2 normal.   Pulmonary:      Effort: Pulmonary effort is normal.      Breath sounds: Normal breath sounds. No wheezing or rhonchi.   Abdominal:      General: Bowel sounds are normal.      Palpations: Abdomen is soft. There is no mass.      Tenderness: There is no abdominal tenderness. There is no guarding.   Genitourinary:     Comments: Not examined  Musculoskeletal:         General: Normal range of motion.      Cervical back: Normal range of motion.      Right lower leg: No edema.      Left lower leg: No edema.   Lymphadenopathy:      Cervical: No cervical adenopathy.   Skin:     General: Skin is warm and dry.      Capillary Refill: Capillary refill takes less than 2 seconds.      Findings: No rash.   Neurological:      General: No focal deficit present.      Mental Status: She is alert and oriented to person, place, and " time. Mental status is at baseline.      Cranial Nerves: Cranial nerves 2-12 are intact. No cranial nerve deficit.      Sensory: Sensation is intact.      Motor: Motor function is intact.      Coordination: Coordination is intact.      Gait: Gait is intact.   Psychiatric:         Mood and Affect: Mood normal.         Behavior: Behavior normal.         Thought Content: Thought content normal.         Judgment: Judgment normal.         Relevant Results  Lab Results   Component Value Date    WBC 3.6 (L) 03/18/2024    WBC 2.9 (L) 09/11/2023    HGB 14.4 03/18/2024    HGB 14.0 09/11/2023    HCT 46.1 (H) 03/18/2024    HCT 44.6 09/11/2023    MCV 94 03/18/2024    MCV 96 09/11/2023     03/18/2024     09/11/2023     Lab Results   Component Value Date     12/31/2024     03/18/2024    K 3.9 12/31/2024    K 4.0 03/18/2024     12/31/2024     03/18/2024    CO2 28 12/31/2024    CO2 33 (H) 03/18/2024    BUN 15 12/31/2024    BUN 15 03/18/2024    CREATININE 0.86 12/31/2024    CREATININE 0.85 03/18/2024    CALCIUM 9.5 12/31/2024    CALCIUM 10.2 03/18/2024    PROT 7.2 12/31/2024    PROT 7.5 03/18/2024    BILITOT 0.7 12/31/2024    BILITOT 0.6 03/18/2024    ALKPHOS 92 12/31/2024    ALKPHOS 103 03/18/2024    ALT 24 12/31/2024    ALT 16 03/18/2024    AST 28 12/31/2024    AST 20 03/18/2024    GLUCOSE 90 12/31/2024    GLUCOSE 64 (L) 03/18/2024     Lab Results   Component Value Date    HGBA1C 5.2 03/18/2024     Lab Results   Component Value Date    TSH 2.96 03/18/2024    FREET4 1.33 05/29/2020      Lab Results   Component Value Date    CHOL 250 (H) 12/31/2024    TRIG 67 12/31/2024    HDL 85.6 12/31/2024    LDLDIRECT 124 03/18/2024           Assessment/Plan   Maddie was seen today for follow-up.  Diagnoses and all orders for this visit:  Annual wellness visit (Primary)  Benign essential hypertension  -     amLODIPine (Norvasc) 10 mg tablet; Take 1 tablet (10 mg) by mouth once daily.  Hypercholesterolemia  -      Comprehensive Metabolic Panel; Future  -     Lipid Panel; Future  -     Comprehensive Metabolic Panel  -     Lipid Panel  Seasonal allergies  -     loratadine (Claritin) 10 mg tablet; Take 1 tablet (10 mg) by mouth once daily.       Medications Discontinued During This Encounter   Medication Reason    loratadine (Claritin) 10 mg tablet Reorder    amLODIPine (Norvasc) 10 mg tablet Reorder        Counseling:   Medication education:   -Education:  The patient is counseled regarding potential side-effects of any and all new medications  -Understanding:  Patient expressed understanding of information discussed today  -Adherence:  No barriers to adherence identified    Advanced care planning: Discussed including healthcare power of  as well as specific end-of-life choices and/or directives was discussed with the patient , voluntarily, and advance care decision was documented in the patient's record.     Final treatment plan is a result of shared decision making with patient.         Alysha King, KEY-CNP         Tobacco/Alcohol/Opioid use, as well as Illicit Drug Use was screened for/reviewed and documented in Social Documentation section of the chart and medication list as appropriate    Depression Screening  Depression screening completed using the PHQ-2 questions with results documented in the chart/encounter (15m).  (See Rooming Screening section for documentation, or note for additional information)    Cardiac Risk Assessment  Cardiovascular risk was discussed and, if needed, lifestyle modifications recommended, including nutritional choices, exercise, and elimination of habits contributing to risk.   We agreed on a plan to reduce the current cardiovascular risk based on above discussion as needed.     Aspirin use/disuse was discussed and documented in the Problem List of the medical record (under Cardiac Risk Counseling) after reviewing the updated guidelines below:  Consider low dose Aspirin (  mg) use if the benefit for cardiovascular disease prevention outweighs risk for bleeding complications.   In general, low dose ASA should be considered:  In patients WITHOUT prior MI/stroke/PAD (primary prevention):   a. Age <60: Use if 10-year cardiovascular disease risk >20%, with discussion of risks and benefits with patient  b. Age 60-<70: Use if 10-year cardiovascular disease risk >20% and low bleeding (e.g., gastrointestinal) risk, with discussion of risks and benefits with patient  c. Age >=70: Do not use    In patients WITH prior MI/stroke/PAD (secondary prevention):   Generally use unless extremely high bleeding (e.g., gastrointenstinal) risk, with discussion of risks and benefits with patient    Advance Directives Discussion  Advanced Care Planning (including a Living Will, Healthcare POA, as well as specific end of life choices and/or directives), was discussed with the patient and/or surrogate, voluntarily, and details of that discussion documented in the Problem List (under Advanced Directives Discussion) of the medical record.    (~16 min spent discussing above)     During the course of the visit the patient was educated and counseled about age appropriate screening and preventive services. Completed preventive screenings were documented in the chart and orders were placed for outstanding screenings/procedures as documented in the Assessment and Plan.    Patient Instructions (the written plan) was given to the patient at check out.         [1]   Current Outpatient Medications   Medication Sig Dispense Refill    multivitamin-min-FA-ginkgo (One Daily Women 50 Plus) 400-120 mcg-mg tablet Take 1 tablet by mouth once daily.      psyllium (Metamucil Fiber Singles) 3.4 gram packet Take by mouth once daily.      turmeric, bulk, 95 % powder Take by mouth.      amLODIPine (Norvasc) 10 mg tablet Take 1 tablet (10 mg) by mouth once daily. 90 tablet 1    loratadine (Claritin) 10 mg tablet Take 1 tablet (10 mg) by  mouth once daily. 90 tablet 1     No current facility-administered medications for this visit.   [2]   Family History  Problem Relation Name Age of Onset    Other (Other) Mother Evita         sepsis    Heart disease Mother Evita     Hypertension Mother Evita     Alzheimer's disease Mother Evita     Coronary artery disease Mother Evita     Kidney disease Father B F     Heart disease Father B F     Cardiomyopathy Father B F     Heart failure Father B F     Dementia Father B F     Seizures Father B F     Arthritis Father B F     Hearing loss Father B F     Stroke Father B F     Dementia Sister Janeth     Thyroid disease Sister Janeth     Diabetes Sister Janeth     Kidney disease Sister Janeth     Kidney disease Brother Hero     Dementia Brother Hero     Other (end stage renal disease) Brother Heor     Hernia Brother Manuel     Kidney disease Sister Janae     Vision loss Mother's Sister Sandra     Hernia Brother Manuel     Kidney disease Sister Janae     Vision loss Mother's Sister Sandra

## 2025-06-09 LAB
ALBUMIN SERPL-MCNC: 4.8 G/DL (ref 3.6–5.1)
ALP SERPL-CCNC: 97 U/L (ref 37–153)
ALT SERPL-CCNC: 13 U/L (ref 6–29)
ANION GAP SERPL CALCULATED.4IONS-SCNC: 9 MMOL/L (CALC) (ref 7–17)
AST SERPL-CCNC: 18 U/L (ref 10–35)
BILIRUB SERPL-MCNC: 0.9 MG/DL (ref 0.2–1.2)
BUN SERPL-MCNC: 17 MG/DL (ref 7–25)
CALCIUM SERPL-MCNC: 9.7 MG/DL (ref 8.6–10.4)
CHLORIDE SERPL-SCNC: 105 MMOL/L (ref 98–110)
CHOLEST SERPL-MCNC: 224 MG/DL
CHOLEST/HDLC SERPL: 2.8 (CALC)
CO2 SERPL-SCNC: 29 MMOL/L (ref 20–32)
CREAT SERPL-MCNC: 0.89 MG/DL (ref 0.5–1.05)
EGFRCR SERPLBLD CKD-EPI 2021: 71 ML/MIN/1.73M2
GLUCOSE SERPL-MCNC: 84 MG/DL (ref 65–99)
HDLC SERPL-MCNC: 81 MG/DL
LDLC SERPL CALC-MCNC: 126 MG/DL (CALC)
NONHDLC SERPL-MCNC: 143 MG/DL (CALC)
POTASSIUM SERPL-SCNC: 4 MMOL/L (ref 3.5–5.3)
PROT SERPL-MCNC: 7.6 G/DL (ref 6.1–8.1)
SODIUM SERPL-SCNC: 143 MMOL/L (ref 135–146)
TRIGL SERPL-MCNC: 74 MG/DL

## 2025-06-11 ENCOUNTER — APPOINTMENT (OUTPATIENT)
Dept: INTEGRATIVE MEDICINE | Facility: CLINIC | Age: 65
End: 2025-06-11
Payer: MEDICARE

## 2025-06-30 ENCOUNTER — APPOINTMENT (OUTPATIENT)
Dept: PRIMARY CARE | Facility: CLINIC | Age: 65
End: 2025-06-30
Payer: MEDICARE

## 2025-07-07 SDOH — SOCIAL STABILITY: SOCIAL NETWORK: I HAVE TROUBLE DOING ALL OF THE FAMILY ACTIVITIES THAT I WANT TO DO: NEVER

## 2025-07-07 SDOH — SOCIAL STABILITY: SOCIAL NETWORK

## 2025-07-07 SDOH — SOCIAL STABILITY: SOCIAL NETWORK: I HAVE TROUBLE DOING ALL OF MY REGULAR LEISURE ACTIVITIES WITH OTHERS: NEVER

## 2025-07-07 SDOH — SOCIAL STABILITY: SOCIAL NETWORK: I HAVE TROUBLE DOING ALL OF MY USUAL WORK (INCLUDE WORK AT HOME): SOMETIMES

## 2025-07-07 SDOH — SOCIAL STABILITY: SOCIAL NETWORK: I HAVE TROUBLE DOING ALL OF THE ACTIVITIES WITH FRIENDS THAT I WANT TO DO: NEVER

## 2025-07-07 SDOH — SOCIAL STABILITY: SOCIAL NETWORK: PROMIS ABILITY TO PARTICIPATE IN SOCIAL ROLES & ACTIVITIES T-SCORE: 56

## 2025-07-07 ASSESSMENT — PROMIS GLOBAL HEALTH SCALE
RATE_QUALITY_OF_LIFE: GOOD
RATE_SOCIAL_SATISFACTION: GOOD
RATE_GENERAL_HEALTH: GOOD
RATE_AVERAGE_PAIN: 7
RATE_AVERAGE_FATIGUE: MODERATE
CARRYOUT_SOCIAL_ACTIVITIES: GOOD
RATE_MENTAL_HEALTH: GOOD
CARRYOUT_PHYSICAL_ACTIVITIES: MODERATELY
EMOTIONAL_PROBLEMS: NEVER
RATE_PHYSICAL_HEALTH: GOOD

## 2025-07-13 SDOH — ECONOMIC STABILITY: FOOD INSECURITY: WITHIN THE PAST 12 MONTHS, YOU WORRIED THAT YOUR FOOD WOULD RUN OUT BEFORE YOU GOT MONEY TO BUY MORE.: NEVER TRUE

## 2025-07-13 SDOH — ECONOMIC STABILITY: FOOD INSECURITY: WITHIN THE PAST 12 MONTHS, THE FOOD YOU BOUGHT JUST DIDN'T LAST AND YOU DIDN'T HAVE MONEY TO GET MORE.: NEVER TRUE

## 2025-07-13 ASSESSMENT — ANXIETY QUESTIONNAIRES
PAST MONTH HOW OFTEN HAVE YOU FELT DIFFICULTIES WERE PILING UP SO HIGH THAT YOU COULD NOT OVERCOME THEM: 0 - NEVER
PAST MONTH HOW OFTEN HAVE YOU FELT THAT THINGS WERE GOING YOUR WAY: 4 - VERY OFTEN
PAST MONTH HOW OFTEN HAVE YOU FELT CONFIDENT ABOUT YOUR ABILITY TO HANDLE YOUR PROBLEMS: 4 - VERY OFTEN
PAST MONTH HOW OFTEN HAVE YOU FELT CONFIDENT ABOUT YOUR ABILITY TO HANDLE YOUR PROBLEMS: 4 - VERY OFTEN
PAST MONTH HOW OFTEN HAVE YOU FELT THAT YOU WERE UNABLE TO CONTROL THE IMPORTANT THINGS IN YOUR LIFE: 0 - NEVER
PAST MONTH HOW OFTEN HAVE YOU FELT THAT YOU WERE UNABLE TO CONTROL THE IMPORTANT THINGS IN YOUR LIFE: 0 - NEVER

## 2025-07-14 ENCOUNTER — APPOINTMENT (OUTPATIENT)
Dept: INTEGRATIVE MEDICINE | Facility: CLINIC | Age: 65
End: 2025-07-14
Payer: MEDICARE

## 2025-07-14 ENCOUNTER — PATIENT MESSAGE (OUTPATIENT)
Dept: INTEGRATIVE MEDICINE | Facility: CLINIC | Age: 65
End: 2025-07-14

## 2025-07-14 VITALS
HEART RATE: 68 BPM | SYSTOLIC BLOOD PRESSURE: 137 MMHG | HEIGHT: 63 IN | OXYGEN SATURATION: 99 % | BODY MASS INDEX: 22.32 KG/M2 | DIASTOLIC BLOOD PRESSURE: 79 MMHG | WEIGHT: 126 LBS

## 2025-07-14 DIAGNOSIS — R26.89 IMPAIRMENT OF BALANCE: ICD-10-CM

## 2025-07-14 DIAGNOSIS — D72.819 LEUKOPENIA, UNSPECIFIED TYPE: ICD-10-CM

## 2025-07-14 DIAGNOSIS — H81.11 BENIGN PAROXYSMAL POSITIONAL VERTIGO OF RIGHT EAR: Primary | ICD-10-CM

## 2025-07-14 DIAGNOSIS — M54.2 CERVICALGIA: ICD-10-CM

## 2025-07-14 DIAGNOSIS — R94.6 ABNORMAL RESULTS OF THYROID FUNCTION STUDIES: ICD-10-CM

## 2025-07-14 DIAGNOSIS — M25.511 PAIN IN JOINT OF RIGHT SHOULDER: ICD-10-CM

## 2025-07-14 DIAGNOSIS — E55.9 VITAMIN D DEFICIENCY: ICD-10-CM

## 2025-07-14 DIAGNOSIS — M54.59 OTHER LOW BACK PAIN: Primary | ICD-10-CM

## 2025-07-14 PROCEDURE — G2212 PROLONG OUTPT/OFFICE VIS: HCPCS | Performed by: INTERNAL MEDICINE

## 2025-07-14 PROCEDURE — 99215 OFFICE O/P EST HI 40 MIN: CPT | Performed by: INTERNAL MEDICINE

## 2025-07-14 ASSESSMENT — ENCOUNTER SYMPTOMS
NECK PAIN: 1
SINUS PRESSURE: 1
SLEEP DISTURBANCE: 0
DYSURIA: 0
DIFFICULTY URINATING: 0
DIZZINESS: 1
LIGHT-HEADEDNESS: 1
HEADACHES: 0
APPETITE CHANGE: 0
ARTHRALGIAS: 0
SINUS PAIN: 0
FATIGUE: 1
SHORTNESS OF BREATH: 0
WEAKNESS: 0
BACK PAIN: 0
NERVOUS/ANXIOUS: 0
COUGH: 0
MYALGIAS: 0
FACIAL SWELLING: 0
FEVER: 0
DIARRHEA: 0
CONSTIPATION: 1

## 2025-07-14 NOTE — PATIENT INSTRUCTIONS
Try out acupuncture for six visits to see if it helps your back pain.   See Rober ortega for your neck and balance issue.   Recommend starting vitamin d 2000 International Units by mouth daily.  It is available over the counter\  Get your labs done.   Read the food as medicine  Try not having any dairy to see if you nasal congestion improves.

## 2025-07-14 NOTE — PROGRESS NOTES
Integrative Medicine Visit:     Subjective   Patient ID: Maddie Flynn is a 65 y.o. female who presents for Sciatica, piain in knee , Neck Pain, and low back pain       Neuropathy      Neck Pain   Pertinent negatives include no chest pain, fever, headaches or weakness.     She has trouble with balance since 2009. She has seen 3 neurologists. Began after a cruise and was told she has some sort of issue from her cruise which began in 2009. Has chronic right neck and right shoulder pain since the cruise and has not memory of injury during the cruise. On plane flight home she developed a right ear ache. Has had needling in her shoulder because her shoulder muscles have been described as tight. Has been PT multiple times for the dizziness. Always aware of right neck and shoulder pain.  Also has right lower back pain and hip pain.   She wobbles when she walks. Feels off balance. Has never lost consciousness. Initially her speech was slurry. She has some numbness in both her left toe. Has not told her pcp. It began in May of 2025.   Denies b12 deficiency.   Lab Results   Component Value Date    QMMBQLKB00 702 05/17/2019      Lab Results   Component Value Date    [id number] 702 05/17/2019      Lab Results   Component Value Date    TSH 2.96 03/18/2024      Lab Results   Component Value Date    VITD25 81 (A) 03/18/2019       Had colonoscopy in 2019 and told to return in ten years.      Used to take a vitamin d level.     CONCERNS:  wants help with the dizziness that she always feels.     PMH:  hypertension- on amlodipine.   Allergies- nasal congestion. Mostly does nasal washes. Takes rare loratadine.   Chronic dizziness.- bumps into things. Has trouble going up stairs.     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     NUTRITION: drinks oat milk. She does eats some ice cream. Eats plant based cheese. Eats protein  "bowls- chicken, veggies and quinoa and lentils. Does not eat meat every day. Eats tofu regularly.     Smoking:  non-smoker    Alcohol use:  rare wine four times per year at most.     Exercise:  walk every day. Does [employer]. Does [employer] videos    SLEEP: did not discuss    STRESS MANAGEMENT: did not discuss  SUPPORT: .     Review of Systems   Constitutional:  Positive for fatigue. Negative for appetite change and fever.   HENT:  Positive for congestion and sinus pressure. Negative for dental problem, ear pain, facial swelling, hearing loss and sinus pain.    Eyes:  Negative for visual disturbance.   Respiratory:  Negative for cough and shortness of breath.    Cardiovascular:  Negative for chest pain and leg swelling.   Gastrointestinal:  Positive for constipation. Negative for diarrhea.   Genitourinary:  Negative for difficulty urinating, dysuria and urgency.   Musculoskeletal:  Positive for neck pain. Negative for arthralgias, back pain and myalgias.   Skin:  Negative for rash.   Neurological:  Positive for dizziness and light-headedness. Negative for weakness and headaches.   Psychiatric/Behavioral:  Negative for behavioral problems and sleep disturbance. The patient is not nervous/anxious.             Pain:    Objective   /79 (BP Location: Left arm, Patient Position: Sitting, BP Cuff Size: Adult)   Pulse 68   Ht 1.6 m (5' 3\")   Wt 57.2 kg (126 lb)   LMP  (LMP Unknown)   SpO2 99%   BMI 22.32 kg/m²       Physical Exam  HENT:      Head: Normocephalic and atraumatic.      Mouth/Throat:      Mouth: Mucous membranes are moist.   Cardiovascular:      Rate and Rhythm: Normal rate.   Pulmonary:      Effort: Pulmonary effort is normal. No respiratory distress.   Musculoskeletal:         General: No swelling or deformity.      Cervical back: Normal range of motion.   Skin:     General: Skin is dry.      Findings: No rash.   Neurological:      General: No focal deficit present.      Mental Status: She " is alert and oriented to person, place, and time.   Psychiatric:         Mood and Affect: Mood normal.         Thought Content: Thought content normal.      Comments: Normal affect                      Assessment/Plan     Problem List Items Addressed This Visit           ICD-10-CM    Leukopenia D72.819    Relevant Orders    Thyroid Stimulating Hormone    Vitamin B12    CBC and Auto Differential    Vitamin D deficiency E55.9    Cervicalgia M54.2    See PT to determine if additional therapies which may not have been tried such as strain counter strain or muscle energy technique could be helpful.           Other Visit Diagnoses         Codes      Other low back pain    -  Primary M54.59    Relevant Orders    Referral to [facility]      Abnormal results of thyroid function studies     R94.6    Relevant Orders    Thyroid Stimulating Hormone           Try out acupuncture for six visits to see if it helps your back pain.   See Rober ortega for your neck and balance issue.   Recommend starting vitamin d 2000 International Units by mouth daily.  It is available over the counter\  Get your labs done.   Read the food as medicine  Try not having any dairy to see if you nasal congestion improves.       Recommend Follow up in : 2 months.     Mery Almonte MD PhD    Time Spent  Prep time on day of patient encounter: 5 minutes  Time spent directly with patient, family or caregiver: 55 minutes  Additional Time Spent on Patient Care Activities: 0 minutes  Documentation Time: 0 minutes  Other Time Spent: 0 minutes  Total: 60 minutes                             [1]   Family History  Problem Relation Name Age of Onset    Other (Other) Mother Evita         sepsis    Heart disease Mother Evita     Hypertension Mother Evita     Alzheimer's disease Mother Evita     Coronary artery disease Mother Evita     Kidney disease Father B F     Heart disease Father B F     Cardiomyopathy Father B F     Heart failure Father B F      Dementia Father B F     Seizures Father B F     Arthritis Father B F     Hearing loss Father B F     Stroke Father B F     Dementia Sister Janeth     Thyroid disease Sister Janeth     Diabetes Sister Janeth     Kidney disease Sister Janeth     Kidney disease Brother Hero     Dementia Brother Hero     Other (end stage renal disease) Brother Hero     Hernia Brother Manuel     Kidney disease Sister Janae     Vision loss Mother's Sister [place]     Hernia Brother Manuel     Kidney disease Sister Janae     Vision loss Mother's Sister Sandra     Accidental death Sister Janeth 60 - 69    Diabetes Sister Sneha 70 - 79

## 2025-07-15 NOTE — ASSESSMENT & PLAN NOTE
See PT to determine if additional therapies which may not have been tried such as strain counter strain or muscle energy technique could be helpful.

## 2025-07-16 LAB
BASOPHILS # BLD AUTO: 30 CELLS/UL (ref 0–200)
BASOPHILS NFR BLD AUTO: 1 %
EOSINOPHIL # BLD AUTO: 99 CELLS/UL (ref 15–500)
EOSINOPHIL NFR BLD AUTO: 3.3 %
ERYTHROCYTE [DISTWIDTH] IN BLOOD BY AUTOMATED COUNT: 12.2 % (ref 11–15)
HCT VFR BLD AUTO: 43.7 % (ref 35–45)
HGB BLD-MCNC: 13.8 G/DL (ref 11.7–15.5)
LYMPHOCYTES # BLD AUTO: 1194 CELLS/UL (ref 850–3900)
LYMPHOCYTES NFR BLD AUTO: 39.8 %
MCH RBC QN AUTO: 29.8 PG (ref 27–33)
MCHC RBC AUTO-ENTMCNC: 31.6 G/DL (ref 32–36)
MCV RBC AUTO: 94.4 FL (ref 80–100)
MONOCYTES # BLD AUTO: 300 CELLS/UL (ref 200–950)
MONOCYTES NFR BLD AUTO: 10 %
NEUTROPHILS # BLD AUTO: 1377 CELLS/UL (ref 1500–7800)
NEUTROPHILS NFR BLD AUTO: 45.9 %
PLATELET # BLD AUTO: 188 THOUSAND/UL (ref 140–400)
PMV BLD REES-ECKER: 10.1 FL (ref 7.5–12.5)
RBC # BLD AUTO: 4.63 MILLION/UL (ref 3.8–5.1)
TSH SERPL-ACNC: 2.74 MIU/L (ref 0.4–4.5)
VIT B12 SERPL-MCNC: 470 PG/ML (ref 200–1100)
WBC # BLD AUTO: 3 THOUSAND/UL (ref 3.8–10.8)

## 2025-08-25 SDOH — ECONOMIC STABILITY: FOOD INSECURITY: WITHIN THE PAST 12 MONTHS, THE FOOD YOU BOUGHT JUST DIDN'T LAST AND YOU DIDN'T HAVE MONEY TO GET MORE.: NEVER TRUE

## 2025-08-25 SDOH — SOCIAL STABILITY: SOCIAL NETWORK: I HAVE TROUBLE DOING ALL OF THE FAMILY ACTIVITIES THAT I WANT TO DO: NEVER

## 2025-08-25 SDOH — SOCIAL STABILITY: SOCIAL NETWORK: I HAVE TROUBLE DOING ALL OF THE ACTIVITIES WITH FRIENDS THAT I WANT TO DO: NEVER

## 2025-08-25 SDOH — ECONOMIC STABILITY: FOOD INSECURITY: WITHIN THE PAST 12 MONTHS, YOU WORRIED THAT YOUR FOOD WOULD RUN OUT BEFORE YOU GOT MONEY TO BUY MORE.: NEVER TRUE

## 2025-08-25 SDOH — SOCIAL STABILITY: SOCIAL NETWORK: I HAVE TROUBLE DOING ALL OF MY USUAL WORK (INCLUDE WORK AT HOME): SOMETIMES

## 2025-08-25 SDOH — SOCIAL STABILITY: SOCIAL NETWORK: PROMIS ABILITY TO PARTICIPATE IN SOCIAL ROLES & ACTIVITIES T-SCORE: 56

## 2025-08-25 SDOH — SOCIAL STABILITY: SOCIAL NETWORK

## 2025-08-25 SDOH — SOCIAL STABILITY: SOCIAL NETWORK: I HAVE TROUBLE DOING ALL OF MY REGULAR LEISURE ACTIVITIES WITH OTHERS: NEVER

## 2025-08-25 ASSESSMENT — PROMIS GLOBAL HEALTH SCALE
RATE_SOCIAL_SATISFACTION: VERY GOOD
CARRYOUT_SOCIAL_ACTIVITIES: VERY GOOD
RATE_QUALITY_OF_LIFE: VERY GOOD
RATE_GENERAL_HEALTH: VERY GOOD
RATE_MENTAL_HEALTH: VERY GOOD
CARRYOUT_PHYSICAL_ACTIVITIES: MODERATELY
RATE_PHYSICAL_HEALTH: GOOD
EMOTIONAL_PROBLEMS: NEVER
RATE_AVERAGE_FATIGUE: MODERATE
RATE_AVERAGE_PAIN: 7

## 2025-08-25 ASSESSMENT — ANXIETY QUESTIONNAIRES
PAST MONTH HOW OFTEN HAVE YOU FELT THAT THINGS WERE GOING YOUR WAY: 3 - FAIRLY OFTEN
PAST MONTH HOW OFTEN HAVE YOU FELT DIFFICULTIES WERE PILING UP SO HIGH THAT YOU COULD NOT OVERCOME THEM: 0 - NEVER
PAST MONTH HOW OFTEN HAVE YOU FELT THAT YOU WERE UNABLE TO CONTROL THE IMPORTANT THINGS IN YOUR LIFE: 1 - ALMOST NEVER
PAST MONTH HOW OFTEN HAVE YOU FELT THAT YOU WERE UNABLE TO CONTROL THE IMPORTANT THINGS IN YOUR LIFE: 1 - ALMOST NEVER
PAST MONTH HOW OFTEN HAVE YOU FELT CONFIDENT ABOUT YOUR ABILITY TO HANDLE YOUR PROBLEMS: 3 - FAIRLY OFTEN
PAST MONTH HOW OFTEN HAVE YOU FELT CONFIDENT ABOUT YOUR ABILITY TO HANDLE YOUR PROBLEMS: 3 - FAIRLY OFTEN

## 2025-08-26 ENCOUNTER — APPOINTMENT (OUTPATIENT)
Dept: INTEGRATIVE MEDICINE | Facility: CLINIC | Age: 65
End: 2025-08-26
Payer: MEDICARE

## 2025-08-26 DIAGNOSIS — M54.59 OTHER LOW BACK PAIN: Primary | ICD-10-CM

## 2025-08-26 DIAGNOSIS — M25.561 RECURRENT PAIN OF RIGHT KNEE: ICD-10-CM

## 2025-08-26 PROCEDURE — 97811 ACUP 1/> W/O ESTIM EA ADD 15: CPT | Performed by: ACUPUNCTURIST

## 2025-08-26 PROCEDURE — 97810 ACUP 1/> WO ESTIM 1ST 15 MIN: CPT | Performed by: ACUPUNCTURIST

## 2025-09-04 ENCOUNTER — APPOINTMENT (OUTPATIENT)
Dept: INTEGRATIVE MEDICINE | Facility: CLINIC | Age: 65
End: 2025-09-04
Payer: MEDICARE

## 2025-09-11 ENCOUNTER — APPOINTMENT (OUTPATIENT)
Dept: INTEGRATIVE MEDICINE | Facility: CLINIC | Age: 65
End: 2025-09-11
Payer: MEDICARE

## 2025-09-18 ENCOUNTER — APPOINTMENT (OUTPATIENT)
Dept: INTEGRATIVE MEDICINE | Facility: CLINIC | Age: 65
End: 2025-09-18
Payer: MEDICARE

## 2025-09-25 ENCOUNTER — APPOINTMENT (OUTPATIENT)
Dept: INTEGRATIVE MEDICINE | Facility: CLINIC | Age: 65
End: 2025-09-25
Payer: MEDICARE

## 2025-10-14 ENCOUNTER — APPOINTMENT (OUTPATIENT)
Dept: INTEGRATIVE MEDICINE | Facility: CLINIC | Age: 65
End: 2025-10-14
Payer: MEDICARE